# Patient Record
Sex: MALE | Race: BLACK OR AFRICAN AMERICAN | Employment: STUDENT | URBAN - METROPOLITAN AREA
[De-identification: names, ages, dates, MRNs, and addresses within clinical notes are randomized per-mention and may not be internally consistent; named-entity substitution may affect disease eponyms.]

---
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
## 2025-05-20 NOTE — PATIENT INSTRUCTIONS
Thank you for choosing Jesus Daley MD at [facility]  To Do: Amando Beatty  1. Please see below   Call [phone] to schedule the appointment.   Please signup for [employer], which is electronic access to your record if you have not done so.  All your results will post on there.  [email or web address]   You can NOW use [employer] to book your appointments with us, or consider using open access scheduling which is through the [facility] website [email or web address] and type in Jesus Daley MD and follow the links for \"Schedule Online Now\"    To schedule Imaging or tests at [place] call Central Scheduling [phone], Go to [facility] A ER Building (For example: CT scans, X rays, Ultrasound, MRI)  Cardiac Testing in ER building Building A second floor Cardiac Testing [phone] (For example: Holter Monitor, Cardiac Stress tests,Event Monitor, or 2D Echocardiograms)  [facility] call [phone] usually in [facility] A  Walk in Clinic in [place] at [address] Mon-Fri at 8am-7:30 p.m., and Sat/Sun 9:00a.m.-4:30 p.m.  Also at [address] [place] [address]  Call [phone] for info     Please call our office about any questions regarding your treatment/medicines/tests as a result of today's visit.  For your safety, read the entire package insert of all medicines prescribed to you and be aware of all of the risks of treatment even beyond those discussed today.  All therapies have potential risk of harm or side effects or medication interactions.  It is your duty and for your safety to discuss with the pharmacist and our office with questions, and to notify us and stop treatment if problems arise, but know that our intention is that the benefits outweigh those potential risks and we strive to make you healthier and to improve your quality of life.    Referrals, and Radiology Information:    If your insurance requires a referral to a specialist, please allow 5 business days to process your  referral request.    If Jesus Daley MD orders a CT or MRI, it may take up to 10 business days to receive approval from your insurance company. Once our office has called informing you that the insurance company approved your testing, please call Central Scheduling at [phone]  Please allow our office 5 business days to contact you regarding any testing results.    Refill policies:   Allow 3 business days for refills; controlled substances may take longer and must be picked up from the office in person.  Narcotic medications can only be filled in 30 day increments and must be refilled at an office visit only.  If your prescription is due for a refill, you may be due for a follow-up appointment.  We cannot refill your maintenance medications at a preventative wellness visit.  To best provide you care, patients receiving maintenance medications need to be seen at least twice a year.

## 2025-05-20 NOTE — PROGRESS NOTES
Subjective:   Patient ID: Amando Beatty is a 19 year old male.    KAREN Goel is a pleasant 18 y/o M who is generally healthy here today after he had right shoulder arthroscopy for labrum tear and [place] where he goes to college and play football for.  No complications from the surgery.  He is currently on summer break and was recommended by his orthopedic surgeon to have physical therapy.  He does not have any pain and limited range of motion.    I had reviewed past medical and family histories together with allergy and medication lists documented.    History/Other:   Review of Systems   Musculoskeletal:  Negative for arthralgias, joint swelling and myalgias.   Neurological:  Negative for weakness and numbness.     Current Medications[1]  Allergies:Allergies[2]    Objective:   Physical Exam  Vitals reviewed.   Constitutional:       General: He is not in acute distress.  HENT:      Mouth/Throat:      Mouth: Mucous membranes are moist.      Pharynx: Oropharynx is clear.   Eyes:      General: No scleral icterus.     Conjunctiva/sclera: Conjunctivae normal.   Cardiovascular:      Rate and Rhythm: Normal rate and regular rhythm.      Heart sounds: Normal heart sounds. No murmur heard.  Pulmonary:      Effort: Pulmonary effort is normal. No respiratory distress.      Breath sounds: Normal breath sounds. No wheezing or rales.   Abdominal:      General: Bowel sounds are normal.      Palpations: Abdomen is soft.   Musculoskeletal:      Right shoulder: No swelling, effusion, tenderness, bony tenderness or crepitus. Normal range of motion. Normal strength.      Cervical back: Neck supple.      Right lower leg: No edema.      Left lower leg: No edema.   Lymphadenopathy:      Cervical: No cervical adenopathy.   Skin:     General: Skin is warm.   Neurological:      Mental Status: He is alert.   Psychiatric:         Mood and Affect: Mood normal.         Behavior: Behavior normal.         Assessment & Plan:   1. Acetabular labrum  tear, right, subsequent encounter    Status post right arthroscopic surgery  Will refer for physical therapy for strengthening exercises  Otherwise I do not have any concerns with his overall health      This note was prepared using Dragon Medical voice recognition dictation software. As a result errors may occur. When identified these errors have been corrected. While every attempt is made to correct errors during dictation discrepancies may still exist            No orders of the defined types were placed in this encounter.      Meds This Visit:  Requested Prescriptions      No prescriptions requested or ordered in this encounter       Imaging & Referrals:  OP REFERRAL TO [facility]         [1]   No current outpatient medications on file.   [2] No Known Allergies

## 2025-05-21 NOTE — PROGRESS NOTES
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 Was able to participate his freshman and sophomore years but gradually became weaker, prompting surgery. At his last MD follow up, he estimated 4-6 months return to sport.   Pain level: current 0 /10, at best  , at worst    Description of symptoms: weak   Occupation: student - sports management   Leisure activities/Hobbies: collegiate D2 athlete at [employer]   Prior level of function: high level  Current limitations: lifting, prolonged reaching overhead, contact sports  Pt goals: return to football this fall, hoping not to red shirt  Hand Dominance: right   Past medical history was reviewed with Amando.  Significant findings include: none  Imaging/Tests:     Amando  has no past medical history on file.  He  has a past surgical history that includes adenoidectomy.    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 The results of the objective tests and measures show reduced shoulder ROM, weakness, and functional mobility/return to sport deficits. Functional deficits include but are not limited to lifting, prolonged reaching overhead, contact sports. Signs and symptoms are consistent with diagnosis of Tear of right glenoid labrum, subsequent encounter (Y82.700T). Pt and PT discussed evaluation findings, pathology, POC and HEP.  Pt voiced  understanding and performs HEP correctly without reported pain. Skilled Physical Therapy is medically necessary to address the above impairments and reach functional goals.  OBJECTIVE:    Musculoskeletal  Observation/Posture: forward head posture; increased thoracic kyphosis (dynamically can correct)  Palpation:     Accessory motion: not assessed due to surgery    Special Tests:       ROM and Strength (* denotes performed with pain)  Shoulder   ROM MMT (-/5)    R L R L     Flex 142   4/5 5/5     Ext             Abd (C5) 148   4-/5 5/5     IR T12 T8 4/5 5/5     ER T4 T4 4-/5 5/5     Low Trap n/a         Mid Trap n/a         SA n/a           Flexibility:  UE Flexibility R L     Upper Trap         Levator Scap         Pec Major         Scalenes         Latissimus         Bicep         LE Flexibility Other R L              Neurological:  Sensation: normal      Today's Treatment and Response:   Pt education was provided on exam findings, treatment diagnosis, treatment plan, expectations, and prognosis.  Today's Treatment       5/21/2025   UE Treatment   Therapeutic Exercise R shoulder PROM with gentle OP  Wall slides flex/scap, 15x5\" ea  Sidelying ER, 2#, 3x10  Sidelying habd, 2#, 3x10  Resisted standing row, green TB, 3x10  Resisted standing ext, green TB, 3x10  Resisted HABD standing, green TB, 3x10   Therapeutic Exercise Minutes 24   Evaluation Minutes 20   Total Time Of Timed Procedures 24   Total Time Of Service-Based Procedures 20   Total Treatment Time 44   HEP Access Code: [email or web address]  URL: [email or web address]  Date: 05/21/2025  Prepared by: Shelby Larsen    Exercises  - Standing Single Shoulder Flexion Wall Slide with Palm Up  - 2 x daily - 7 x weekly - 3 sets - 10 reps  - Standing Shoulder Abduction Slides at Wall  - 2 x daily - 7 x weekly - 3 sets - 10 reps  - Sidelying Shoulder External Rotation  - 1 x daily - 7 x weekly - 3 sets - 10 reps - 2-5 #  - Sidelying Shoulder Horizontal  Abduction  - 1 x daily - 7 x weekly - 3 sets - 10 reps - 2-5 #  - Standing Shoulder Row with Anchored Resistance  - 1 x daily - 7 x weekly - 3 sets - 10 reps - green band  - Shoulder extension with resistance - Neutral  - 1 x daily - 7 x weekly - 2 sets - 15 reps - green band  - Standing Shoulder Horizontal Abduction with Anchored Resistance  - 1 x daily - 7 x weekly - 3 sets - 10 reps - green band        Patient was instructed in and issued a HEP for: Access Code: [email or web address]  URL: [email or web address]  Date: 05/21/2025  Prepared by: Shelby Larsen    Exercises  - Standing Single Shoulder Flexion Wall Slide with Palm Up  - 2 x daily - 7 x weekly - 3 sets - 10 reps  - Standing Shoulder Abduction Slides at Wall  - 2 x daily - 7 x weekly - 3 sets - 10 reps  - Sidelying Shoulder External Rotation  - 1 x daily - 7 x weekly - 3 sets - 10 reps - 2-5 #  - Sidelying Shoulder Horizontal Abduction  - 1 x daily - 7 x weekly - 3 sets - 10 reps - 2-5 #  - Standing Shoulder Row with Anchored Resistance  - 1 x daily - 7 x weekly - 3 sets - 10 reps - green band  - Shoulder extension with resistance - Neutral  - 1 x daily - 7 x weekly - 2 sets - 15 reps - green band  - Standing Shoulder Horizontal Abduction with Anchored Resistance  - 1 x daily - 7 x weekly - 3 sets - 10 reps - green band    Charges:  PT EVAL: Low Complexity, 2 TE  In agreement with evaluation findings and clinical rationale, this evaluation involved LOW COMPLEXITY decision making due to no personal factors/comorbidities, 1-2 body structures involved/activity limitations, and stable symptoms as documented in the evaluation.                                                          PLAN OF CARE:    Goals: (to be met in 24 visits)       Goals: (To be met in 8-10 weeks post op )      Not Met Progress Toward Partially Met Met   Pt will improve shoulder flexion AROM to >150 degrees to be able to reach into overhead cabinets without pain or restriction  []  []  []  []    Pt will improve shoulder abduction AROM to >130 degrees to improve ability to don deodorant, don/doff shirts, and wash hair  []  []  []  []    Pt will increase shoulder AROM ER to 80 degrees to be able to reach and fasten seatbelt  []  []  []  []    Pt will increase shoulder AROM IR to 70 degrees to be able to reach in back pocket, tuck in shirt, and turn steering wheel without pain []  []  []  []    Pt will be independent and compliant with comprehensive HEP to maintain progress achieved in PT  []  []  []  []         Goals: (To be met 4-6 months post op       Not Met Progress Toward Partially Met Met   Pt will improve shoulder strength throughout to 5/5 to improve function with return to sport []  []  []  []    Pt will demonstrate increased mid/low trap strength to 5/5 to promote improved shoulder mechanics and stabilization with lifting and reaching []  []  []  []    Pt will be independent and compliant with comprehensive HEP to maintain progress achieved in PT  []  []  []  []           Frequency / Duration: Patient will be seen 2x/week or a total of 24  visits over a 90 day period. Treatment will include: Manual Therapy; Neuromuscular Re-education; Therapeutic Activities; Therapeutic Exercise; Home Exercise Program instruction; Electrical stimulation (unattended); Patient/Family Education    Education or treatment limitation: None   Rehab Potential: excellent     QuickDASH Outcome Score  Score: (Patient-Rptd) 18.18 % (5/21/2025  2:11 PM)      Patient/Family/Caregiver was advised of these findings, precautions, and treatment options and has agreed to actively participate in planning and for this course of care.    Thank you for your referral. Please co-sign or sign and return this letter via fax as soon as possible to [phone]. If you have any questions, please contact me at Dept: [phone]    Sincerely,  Electronically signed by therapist: Shelby John, PT  Physician's certification  required: Yes  I certify the need for these services furnished under this plan of treatment and while under my care.    X___________________________________________________ Date____________________    Certification From: 5/21/2025  To:8/19/2025

## 2025-05-23 NOTE — PROGRESS NOTES
Patient: Amando Beatty (19 year old, male) Referring Provider:  Insurance:   Diagnosis: Tear of right glenoid labrum, subsequent encounter (S48.659S) Jesus Daley  [place] [employer]   Date of Surgery: 4/1/25 Next MD visit:  N/A   Precautions:    6/18/25 Referral Information:    Date of Evaluation: Req: 12, Auth: 12, Exp: 7/20/2025 05/21/25 POC Auth Visits:          Today's Date   5/23/2025    Subjective  States no soreness or complaints after first visit.       Pain: 0/10     Objective  mild shoulder shrug with light resisted scaption - visual cues assisted to reduce       Shoulder       5/21/2025 5/23/2025   Shoulder ROM/MMT   Rt Flexion Shoulder 142 170 P with stiffness at end range   Rt Flexion Shoulder MMT 4/5    Lt Flexion Shoulder MMT 5/5    Rt Abduction Shoulder 148 165 P   Rt Abduction Shoulder MMT (C5) 4-/5    Lt Abduction Shoulder MMT (C5) 5/5    Rt IR Shoulder T12 60 P   Lt IR Shoulder T8    Rt IR Shoulder MMT 4/5    Lt IR Shoulder MMT 5/5    Rt ER Shoulder T4 95 P   Lt ER Shoulder T4    Rt ER Shoulder MMT 4-/5    Lt ER Shoulder MMT 5/5         Assessment  Pt displays excellent compliance with HEP. He displays less stiffness with PROM this visits as compared to IE. He was progressed in gentle resisted exercises emphasizing correct form and muscle activation.    Goals (to be met in 24 visits)   Goals: (To be met in 8-10 weeks post op )      Not Met Progress Toward Partially Met Met   Pt will improve shoulder flexion AROM to >150 degrees to be able to reach into overhead cabinets without pain or restriction []  []  []  []    Pt will improve shoulder abduction AROM to >130 degrees to improve ability to don deodorant, don/doff shirts, and wash hair  []  []  []  []    Pt will increase shoulder AROM ER to 80 degrees to be able to reach and fasten seatbelt  []  []  []  []    Pt will increase shoulder AROM IR to 70 degrees to be able to reach in back pocket, tuck in shirt, and turn steering wheel without  pain []  []  []  []    Pt will be independent and compliant with comprehensive HEP to maintain progress achieved in PT  []  []  []  []         Goals: (To be met 4-6 months post op       Not Met Progress Toward Partially Met Met   Pt will improve shoulder strength throughout to 5/5 to improve function with return to sport []  []  []  []    Pt will demonstrate increased mid/low trap strength to 5/5 to promote improved shoulder mechanics and stabilization with lifting and reaching []  []  []  []    Pt will be independent and compliant with comprehensive HEP to maintain progress achieved in PT  []  []  []  []               Plan  Continue per protocol and pt tolerance.    Treatment Last 4 Visits  Treatment Day: 2       5/21/2025 5/23/2025   UE Treatment   Therapeutic Exercise R shoulder PROM with gentle OP  Wall slides flex/scap, 15x5\" ea  Sidelying ER, 2#, 3x10  Sidelying habd, 2#, 3x10  Resisted standing row, green TB, 3x10  Resisted standing ext, green TB, 3x10  Resisted HABD standing, green TB, 3x10 UBE 2'/2'  Wall slides flex/scap, 10x10\" ea   Gentle BTB with pulleys 2'  R shoulder PROM with gentle OP in all directions  Supine RS at 60, 90, 120 deg flex, 1x30\" ea   Serratus press in supine, 3#, 3x10  Sidelying ER, 3#, 3x10  Sidelying HABD, 3#, 3x10  Prone row, 5#, 3x10  Prone ext, 3#, 3x10  Prone T, 2#, 3x10  Prone Y, 0#, 3x10  Standing IYT, 2#, 2x10  Resisted bilat ER, GTB, 3x10  Resisted bilat HABD, GTB, 3x10  Weighted ball CKC scap stabilization sup/inf, RL, circles at wall, red ball, x30 ea in flex and scap  Pec stretch door, 1x30\"  Posterior cuff stretch, 1x30\"      Therapeutic Exercise Minutes 24 45   Evaluation Minutes 20    Total Time Of Timed Procedures 24 45   Total Time Of Service-Based Procedures 20 0   Total Treatment Time 44 45   HEP Access Code: [email or web address]  URL: [email or web address]  Date: 05/21/2025  Prepared by: Shelby Larsen    Exercises  - Standing Single Shoulder Flexion Wall  Slide with Palm Up  - 2 x daily - 7 x weekly - 3 sets - 10 reps  - Standing Shoulder Abduction Slides at Wall  - 2 x daily - 7 x weekly - 3 sets - 10 reps  - Sidelying Shoulder External Rotation  - 1 x daily - 7 x weekly - 3 sets - 10 reps - 2-5 #  - Sidelying Shoulder Horizontal Abduction  - 1 x daily - 7 x weekly - 3 sets - 10 reps - 2-5 #  - Standing Shoulder Row with Anchored Resistance  - 1 x daily - 7 x weekly - 3 sets - 10 reps - green band  - Shoulder extension with resistance - Neutral  - 1 x daily - 7 x weekly - 2 sets - 15 reps - green band  - Standing Shoulder Horizontal Abduction with Anchored Resistance  - 1 x daily - 7 x weekly - 3 sets - 10 reps - green band         HEP  Access Code: [email or web address]  URL: [email or web address]  Date: 05/21/2025  Prepared by: Shelby Larsen    Exercises  - Standing Single Shoulder Flexion Wall Slide with Palm Up  - 2 x daily - 7 x weekly - 3 sets - 10 reps  - Standing Shoulder Abduction Slides at Wall  - 2 x daily - 7 x weekly - 3 sets - 10 reps  - Sidelying Shoulder External Rotation  - 1 x daily - 7 x weekly - 3 sets - 10 reps - 2-5 #  - Sidelying Shoulder Horizontal Abduction  - 1 x daily - 7 x weekly - 3 sets - 10 reps - 2-5 #  - Standing Shoulder Row with Anchored Resistance  - 1 x daily - 7 x weekly - 3 sets - 10 reps - green band  - Shoulder extension with resistance - Neutral  - 1 x daily - 7 x weekly - 2 sets - 15 reps - green band  - Standing Shoulder Horizontal Abduction with Anchored Resistance  - 1 x daily - 7 x weekly - 3 sets - 10 reps - green band    Charges  3 TE

## 2025-05-27 NOTE — PROGRESS NOTES
Patient: Amando Beatty (19 year old, male) Referring Provider:  Insurance:   Diagnosis: Tear of right glenoid labrum, subsequent encounter (M11.656F) Jesus Daley  [place] [employer] MEDICAID   Date of Surgery: 4/1/25 Next MD visit:  N/A   Precautions:    6/18/25 Referral Information:    Date of Evaluation: Req: 12, Auth: 12, Exp: 7/20/2025 05/21/25 POC Auth Visits:          Today's Date   5/27/2025    Subjective  Biceps soreness noted, otherwise no new complaints.       Pain: 0/10     Objective  Improved shoulder shrug in clinic.          Assessment  Pt continues to progress exceptionally well through ROM adn strength. Fatigue evident and muscle weakness still present as muscle fasciculations present with increased resistance in clinic, and more rest breaks needed.    Goals (to be met in 24 visits)   Goals: (To be met in 8-10 weeks post op )      Not Met Progress Toward Partially Met Met   Pt will improve shoulder flexion AROM to >150 degrees to be able to reach into overhead cabinets without pain or restriction []  []  []  []    Pt will improve shoulder abduction AROM to >130 degrees to improve ability to don deodorant, don/doff shirts, and wash hair  []  []  []  []    Pt will increase shoulder AROM ER to 80 degrees to be able to reach and fasten seatbelt  []  []  []  []    Pt will increase shoulder AROM IR to 70 degrees to be able to reach in back pocket, tuck in shirt, and turn steering wheel without pain []  []  []  []    Pt will be independent and compliant with comprehensive HEP to maintain progress achieved in PT  []  []  []  []         Goals: (To be met 4-6 months post op       Not Met Progress Toward Partially Met Met   Pt will improve shoulder strength throughout to 5/5 to improve function with return to sport []  []  []  []    Pt will demonstrate increased mid/low trap strength to 5/5 to promote improved shoulder mechanics and stabilization with lifting and reaching []  []  []  []    Pt will be independent  and compliant with comprehensive HEP to maintain progress achieved in PT  []  []  []  []                   Plan  Continue per protocol and pt tolerance.    Treatment Last 4 Visits  Treatment Day: 3       5/21/2025 5/23/2025 5/27/2025   UE Treatment   Therapeutic Exercise R shoulder PROM with gentle OP  Wall slides flex/scap, 15x5\" ea  Sidelying ER, 2#, 3x10  Sidelying habd, 2#, 3x10  Resisted standing row, green TB, 3x10  Resisted standing ext, green TB, 3x10  Resisted HABD standing, green TB, 3x10 UBE 2'/2'  Wall slides flex/scap, 10x10\" ea   Gentle BTB with pulleys 2'  R shoulder PROM with gentle OP in all directions  Supine RS at 60, 90, 120 deg flex, 1x30\" ea   Serratus press in supine, 3#, 3x10  Sidelying ER, 3#, 3x10  Sidelying HABD, 3#, 3x10  Prone row, 5#, 3x10  Prone ext, 3#, 3x10  Prone T, 2#, 3x10  Prone Y, 0#, 3x10  Standing IYT, 2#, 2x10  Resisted bilat ER, GTB, 3x10  Resisted bilat HABD, GTB, 3x10  Weighted ball CKC scap stabilization sup/inf, RL, circles at wall, red ball, x30 ea in flex and scap  Pec stretch door, 1x30\"  Posterior cuff stretch, 1x30\"    UBE 2'/2'   Wall slides flex/scap, 10x10\" ea   Pec stretch door, 2x30\"   R shoulder PROM with gentle OP in all directions   Serratus press in supine, 4#, 3x10   Sidelying ER, 4#, 3x10   Sidelying HABD, 4#, 3x10   Prone row, 7#, 3x10   Prone ext, 5#, 3x10   Prone T, 3#, 3x10   Prone Y, 2#, 3x10   Resisted anchored B HABD, GTB, 2x10   Resisted anchored B diagonals, GTB, 2x10 ea   Standing IYT, 2#, 2x10   Resisted bilat ER, GTB, 3x10   Biceps stretch, 2x30\" in door  Posterior cuff stretch, 2x30\"      Neuro Re-Education   Weighted ball CKC scap stabilization circles at wall, red ball, x30 ea in flex and scap   OKC weighted ball scap stabilization sup/inf and M/L, red ball, x30 ea in   Body blade static, AP, ML and sup/inf 1x30\"    Manual Therapy   IASTM: biceps, delt   Therapeutic Exercise Minutes 24 45 25   Neuro Re-Educ Minutes   12   Manual Therapy  Minutes   12   Evaluation Minutes 20     Total Time Of Timed Procedures 24 45 49   Total Time Of Service-Based Procedures 20 0 0   Total Treatment Time 44 45 49   HEP Access Code: [email or web address]  URL: [email or web address]  Date: 05/21/2025  Prepared by: Shelby TANNERChristopher    Exercises  - Standing Single Shoulder Flexion Wall Slide with Palm Up  - 2 x daily - 7 x weekly - 3 sets - 10 reps  - Standing Shoulder Abduction Slides at Wall  - 2 x daily - 7 x weekly - 3 sets - 10 reps  - Sidelying Shoulder External Rotation  - 1 x daily - 7 x weekly - 3 sets - 10 reps - 2-5 #  - Sidelying Shoulder Horizontal Abduction  - 1 x daily - 7 x weekly - 3 sets - 10 reps - 2-5 #  - Standing Shoulder Row with Anchored Resistance  - 1 x daily - 7 x weekly - 3 sets - 10 reps - green band  - Shoulder extension with resistance - Neutral  - 1 x daily - 7 x weekly - 2 sets - 15 reps - green band  - Standing Shoulder Horizontal Abduction with Anchored Resistance  - 1 x daily - 7 x weekly - 3 sets - 10 reps - green band          HEP  Access Code: [email or web address]  URL: [email or web address]  Date: 05/21/2025  Prepared by: Shelby O\'Hcristopher    Exercises  - Standing Single Shoulder Flexion Wall Slide with Palm Up  - 2 x daily - 7 x weekly - 3 sets - 10 reps  - Standing Shoulder Abduction Slides at Wall  - 2 x daily - 7 x weekly - 3 sets - 10 reps  - Sidelying Shoulder External Rotation  - 1 x daily - 7 x weekly - 3 sets - 10 reps - 2-5 #  - Sidelying Shoulder Horizontal Abduction  - 1 x daily - 7 x weekly - 3 sets - 10 reps - 2-5 #  - Standing Shoulder Row with Anchored Resistance  - 1 x daily - 7 x weekly - 3 sets - 10 reps - green band  - Shoulder extension with resistance - Neutral  - 1 x daily - 7 x weekly - 2 sets - 15 reps - green band  - Standing Shoulder Horizontal Abduction with Anchored Resistance  - 1 x daily - 7 x weekly - 3 sets - 10 reps - green band    Charges  2 TE, 1 NMR, 1 manual

## 2025-05-30 NOTE — PROGRESS NOTES
Patient: Amando Beatty (19 year old, male) Referring Provider:  Insurance:   Diagnosis: Tear of right glenoid labrum, subsequent encounter (O22.913W) Jesus Daley  [place] [employer] MEDICA[employer]   Date of Surgery: 4/1/25 Next MD visit:  N/A   Precautions:    6/18/25 Referral Information:    Date of Evaluation: Req: 12, Auth: 12, Exp: 7/20/2025 05/21/25 POC Auth Visits:          Today's Date   5/30/2025    Subjective  Biceps continues to be sore.       Pain: 2/10 (mid biceps)     Objective        Incisions healing well     Assessment  Educated on compensatory patterns that might be contributing to increased biceps irritation. Was progressed lightly in resistance this session to continue promoting strength and endurance.    Goals (to be met in 24 visits)   Goals: (To be met in 8-10 weeks post op )      Not Met Progress Toward Partially Met Met   Pt will improve shoulder flexion AROM to >150 degrees to be able to reach into overhead cabinets without pain or restriction []  []  []  []    Pt will improve shoulder abduction AROM to >130 degrees to improve ability to don deodorant, don/doff shirts, and wash hair  []  []  []  []    Pt will increase shoulder AROM ER to 80 degrees to be able to reach and fasten seatbelt  []  []  []  []    Pt will increase shoulder AROM IR to 70 degrees to be able to reach in back pocket, tuck in shirt, and turn steering wheel without pain []  []  []  []    Pt will be independent and compliant with comprehensive HEP to maintain progress achieved in PT  []  []  []  []         Goals: (To be met 4-6 months post op       Not Met Progress Toward Partially Met Met   Pt will improve shoulder strength throughout to 5/5 to improve function with return to sport []  []  []  []    Pt will demonstrate increased mid/low trap strength to 5/5 to promote improved shoulder mechanics and stabilization with lifting and reaching []  []  []  []    Pt will be independent and compliant with comprehensive HEP to maintain  progress achieved in PT  []  []  []  []                       Plan  Continue progression of strength, stabilization of RC and scapula    Treatment Last 4 Visits  Treatment Day: 4       5/21/2025 5/23/2025 5/27/2025 5/30/2025   UE Treatment   Therapeutic Exercise R shoulder PROM with gentle OP  Wall slides flex/scap, 15x5\" ea  Sidelying ER, 2#, 3x10  Sidelying habd, 2#, 3x10  Resisted standing row, green TB, 3x10  Resisted standing ext, green TB, 3x10  Resisted HABD standing, green TB, 3x10 UBE 2'/2'  Wall slides flex/scap, 10x10\" ea   Gentle BTB with pulleys 2'  R shoulder PROM with gentle OP in all directions  Supine RS at 60, 90, 120 deg flex, 1x30\" ea   Serratus press in supine, 3#, 3x10  Sidelying ER, 3#, 3x10  Sidelying HABD, 3#, 3x10  Prone row, 5#, 3x10  Prone ext, 3#, 3x10  Prone T, 2#, 3x10  Prone Y, 0#, 3x10  Standing IYT, 2#, 2x10  Resisted bilat ER, GTB, 3x10  Resisted bilat HABD, GTB, 3x10  Weighted ball CKC scap stabilization sup/inf, RL, circles at wall, red ball, x30 ea in flex and scap  Pec stretch door, 1x30\"  Posterior cuff stretch, 1x30\"    UBE 2'/2'   Wall slides flex/scap, 10x10\" ea   Pec stretch door, 2x30\"   R shoulder PROM with gentle OP in all directions   Serratus press in supine, 4#, 3x10   Sidelying ER, 4#, 3x10   Sidelying HABD, 4#, 3x10   Prone row, 7#, 3x10   Prone ext, 5#, 3x10   Prone T, 3#, 3x10   Prone Y, 2#, 3x10   Resisted anchored B HABD, GTB, 2x10   Resisted anchored B diagonals, GTB, 2x10 ea   Standing IYT, 2#, 2x10   Resisted bilat ER, GTB, 3x10   Biceps stretch, 2x30\" in door  Posterior cuff stretch, 2x30\"    UBE 3'/3'   Wall slides flex/scap, 10x10\" ea   Pec stretch door, 2x30\"   R shoulder PROM with gentle OP in all directions   Sidelying ER, 4#, 3x10   Sidelying HABD, 4#, 3x10  Prone row, 7#, 3x10   Prone ext, 5#, 3x10   Prone T, 4#, 3x10   Prone Y, 3#, 3x10   Resisted anchored B HABD, GTB, 2x10   Resisted anchored B diagonals, GTB, 2x10 ea   Tricep press down, black  tubing, 3x10   Bicep curl, blue tubing, 3x10  Standing IYT, 2#, 3x10   Push ups at barre, 2x10   Resisted bilat ER, GTB, 3x10   Biceps stretch, 2x30\" in door   Posterior cuff stretch, 2x30\"      Neuro Re-Education   Weighted ball CKC scap stabilization circles at wall, red ball, x30 ea in flex and scap   OKC weighted ball scap stabilization sup/inf and M/L, red ball, x30 ea in   Body blade static, AP, ML and sup/inf 1x30\"  RS supine 60, 90, 120 deg flexion, 1x30\"   Supine alphabets, 2#, 2 rounds  Weighted ball CKC scap stabilization circles at wall, red ball, x30 ea in flex and scap   OKC weighted ball scap stabilization sup/inf and M/L, red ball, x30 ea in  Foam roll serratus wall slides, 3x10  Body blade static, AP, ML, sup/inf low and high, lateral AP 1x30\"      Manual Therapy   IASTM: biceps, delt STM/IASTM: biceps, delt   Therapeutic Exercise Minutes 24 45 25 25   Neuro Re-Educ Minutes   12 25   Manual Therapy Minutes   12 10   Evaluation Minutes 20      Total Time Of Timed Procedures 24 45 49 60   Total Time Of Service-Based Procedures 20 0 0 0   Total Treatment Time 44 45 49 60   HEP Access Code: [email or web address]  URL: [email or web address]  Date: 05/21/2025  Prepared by: Shelby Larsen    Exercises  - Standing Single Shoulder Flexion Wall Slide with Palm Up  - 2 x daily - 7 x weekly - 3 sets - 10 reps  - Standing Shoulder Abduction Slides at Wall  - 2 x daily - 7 x weekly - 3 sets - 10 reps  - Sidelying Shoulder External Rotation  - 1 x daily - 7 x weekly - 3 sets - 10 reps - 2-5 #  - Sidelying Shoulder Horizontal Abduction  - 1 x daily - 7 x weekly - 3 sets - 10 reps - 2-5 #  - Standing Shoulder Row with Anchored Resistance  - 1 x daily - 7 x weekly - 3 sets - 10 reps - green band  - Shoulder extension with resistance - Neutral  - 1 x daily - 7 x weekly - 2 sets - 15 reps - green band  - Standing Shoulder Horizontal Abduction with Anchored Resistance  - 1 x daily - 7 x weekly - 3 sets - 10 reps  - green band           HEP  Access Code: [email or web address]  URL: [email or web address]  Date: 05/21/2025  Prepared by: Shebly Larsen    Exercises  - Standing Single Shoulder Flexion Wall Slide with Palm Up  - 2 x daily - 7 x weekly - 3 sets - 10 reps  - Standing Shoulder Abduction Slides at Wall  - 2 x daily - 7 x weekly - 3 sets - 10 reps  - Sidelying Shoulder External Rotation  - 1 x daily - 7 x weekly - 3 sets - 10 reps - 2-5 #  - Sidelying Shoulder Horizontal Abduction  - 1 x daily - 7 x weekly - 3 sets - 10 reps - 2-5 #  - Standing Shoulder Row with Anchored Resistance  - 1 x daily - 7 x weekly - 3 sets - 10 reps - green band  - Shoulder extension with resistance - Neutral  - 1 x daily - 7 x weekly - 2 sets - 15 reps - green band  - Standing Shoulder Horizontal Abduction with Anchored Resistance  - 1 x daily - 7 x weekly - 3 sets - 10 reps - green band    Charges  2 TE, 2 NMR, 1 manual

## 2025-06-04 NOTE — PROGRESS NOTES
Patient: Amando Beatty (19 year old, male) Referring Provider:  Insurance:   Diagnosis: Tear of right glenoid labrum, subsequent encounter (R89.532K) Jesus Daley  [place] [employer] MEDICA[employer]   Date of Surgery: 4/1/25 Next MD visit:  N/A   Precautions:    6/18/25 Referral Information:    Date of Evaluation: Req: 12, Auth: 12, Exp: 7/20/2025 05/21/25 POC Auth Visits:          Today's Date   6/4/2025    Subjective  Biceps felt better but fatigues quickly in therapy exercises       Pain: 1/10     Objective  minimal shoulder shrug present with exercises performed in clinic; mild end range limitations in PROM in flexion/abduction. WNL IR/ER          Assessment  Overall progressing exceptionally well. Continues to notice endurance limitations contributing to early fatigue which might be aiding in biceps sorness. Continued progression of scapular and RC strengthening to improve shoulder stability for eventual higher level activity and return to sport.    Goals (to be met in 24 visits)   Goals: (To be met in 8-10 weeks post op )      Not Met Progress Toward Partially Met Met   Pt will improve shoulder flexion AROM to >150 degrees to be able to reach into overhead cabinets without pain or restriction []  []  []  []    Pt will improve shoulder abduction AROM to >130 degrees to improve ability to don deodorant, don/doff shirts, and wash hair  []  []  []  []    Pt will increase shoulder AROM ER to 80 degrees to be able to reach and fasten seatbelt  []  []  []  []    Pt will increase shoulder AROM IR to 70 degrees to be able to reach in back pocket, tuck in shirt, and turn steering wheel without pain []  []  []  []    Pt will be independent and compliant with comprehensive HEP to maintain progress achieved in PT  []  []  []  []         Goals: (To be met 4-6 months post op       Not Met Progress Toward Partially Met Met   Pt will improve shoulder strength throughout to 5/5 to improve function with return to sport []  []  []  []    Pt  will demonstrate increased mid/low trap strength to 5/5 to promote improved shoulder mechanics and stabilization with lifting and reaching []  []  []  []    Pt will be independent and compliant with comprehensive HEP to maintain progress achieved in PT  []  []  []  []                           Plan  Continue progression of strength, stabilization of RC and scapula    Treatment Last 4 Visits  Treatment Day: 5       5/23/2025 5/27/2025 5/30/2025 6/4/2025   UE Treatment   Therapeutic Exercise UBE 2'/2'  Wall slides flex/scap, 10x10\" ea   Gentle BTB with pulleys 2'  R shoulder PROM with gentle OP in all directions  Supine RS at 60, 90, 120 deg flex, 1x30\" ea   Serratus press in supine, 3#, 3x10  Sidelying ER, 3#, 3x10  Sidelying HABD, 3#, 3x10  Prone row, 5#, 3x10  Prone ext, 3#, 3x10  Prone T, 2#, 3x10  Prone Y, 0#, 3x10  Standing IYT, 2#, 2x10  Resisted bilat ER, GTB, 3x10  Resisted bilat HABD, GTB, 3x10  Weighted ball CKC scap stabilization sup/inf, RL, circles at wall, red ball, x30 ea in flex and scap  Pec stretch door, 1x30\"  Posterior cuff stretch, 1x30\"    UBE 2'/2'   Wall slides flex/scap, 10x10\" ea   Pec stretch door, 2x30\"   R shoulder PROM with gentle OP in all directions   Serratus press in supine, 4#, 3x10   Sidelying ER, 4#, 3x10   Sidelying HABD, 4#, 3x10   Prone row, 7#, 3x10   Prone ext, 5#, 3x10   Prone T, 3#, 3x10   Prone Y, 2#, 3x10   Resisted anchored B HABD, GTB, 2x10   Resisted anchored B diagonals, GTB, 2x10 ea   Standing IYT, 2#, 2x10   Resisted bilat ER, GTB, 3x10   Biceps stretch, 2x30\" in door  Posterior cuff stretch, 2x30\"    UBE 3'/3'   Wall slides flex/scap, 10x10\" ea   Pec stretch door, 2x30\"   R shoulder PROM with gentle OP in all directions   Sidelying ER, 4#, 3x10   Sidelying HABD, 4#, 3x10  Prone row, 7#, 3x10   Prone ext, 5#, 3x10   Prone T, 4#, 3x10   Prone Y, 3#, 3x10   Resisted anchored B HABD, GTB, 2x10   Resisted anchored B diagonals, GTB, 2x10 ea   Tricep press down, black  tubing, 3x10   Bicep curl, blue tubing, 3x10  Standing IYT, 2#, 3x10   Push ups at barre, 2x10   Resisted bilat ER, GTB, 3x10   Biceps stretch, 2x30\" in door   Posterior cuff stretch, 2x30\"    UBE 3'/3'   Wall slides flex/scap, 10x10\" ea   Pec stretch door, 2x30\"   R shoulder PROM with gentle OP in all directions   Sidelying ER, 4#, 3x10   Sidelying HABD, 4#, 3x10   Prone row, 9#, 3x10   Prone ext, 6#, 3x10   Prone T, 4#, 3x10   Prone Y, 3#, 3x10   Push ups at barre, 3x10   Resisted anchored B HABD, BluTB, 2x10   Resisted anchored B diagonals, BluTB, 2x10 ea   Tricep press down, black tubing, 3x10   Bicep curl, black tubing, 3x10     Standing IYT, 2#, 3x10   Resisted bilat ER, GTB, 3x10   Biceps stretch, 2x30\" in door   Posterior cuff stretch, 2x30\"      Neuro Re-Education  Weighted ball CKC scap stabilization circles at wall, red ball, x30 ea in flex and scap   OKC weighted ball scap stabilization sup/inf and M/L, red ball, x30 ea in   Body blade static, AP, ML and sup/inf 1x30\"  RS supine 60, 90, 120 deg flexion, 1x30\"   Supine alphabets, 2#, 2 rounds  Weighted ball CKC scap stabilization circles at wall, red ball, x30 ea in flex and scap   OKC weighted ball scap stabilization sup/inf and M/L, red ball, x30 ea in  Foam roll serratus wall slides, 3x10  Body blade static, AP, ML, sup/inf low and high, lateral AP 1x30\"    4 way body blade, 2x30\" ea    Manual Therapy  IASTM: biceps, delt STM/IASTM: biceps, delt    Therapeutic Exercise Minutes 45 25 25    Neuro Re-Educ Minutes  12 25    Manual Therapy Minutes  12 10    Total Time Of Timed Procedures 45 49 60 0   Total Time Of Service-Based Procedures 0 0 0 0   Total Treatment Time 45 49 60 0        HEP  Access Code: [email or web address]  URL: [email or web address]  Date: 05/21/2025  Prepared by: Shelby Larsen    Exercises  - Standing Single Shoulder Flexion Wall Slide with Palm Up  - 2 x daily - 7 x weekly - 3 sets - 10 reps  - Standing Shoulder Abduction  Slides at Wall  - 2 x daily - 7 x weekly - 3 sets - 10 reps  - Sidelying Shoulder External Rotation  - 1 x daily - 7 x weekly - 3 sets - 10 reps - 2-5 #  - Sidelying Shoulder Horizontal Abduction  - 1 x daily - 7 x weekly - 3 sets - 10 reps - 2-5 #  - Standing Shoulder Row with Anchored Resistance  - 1 x daily - 7 x weekly - 3 sets - 10 reps - green band  - Shoulder extension with resistance - Neutral  - 1 x daily - 7 x weekly - 2 sets - 15 reps - green band  - Standing Shoulder Horizontal Abduction with Anchored Resistance  - 1 x daily - 7 x weekly - 3 sets - 10 reps - green band    Charges

## 2025-06-10 NOTE — PROGRESS NOTES
Patient: Amando Beatty (19 year old, male) Referring Provider:  Insurance:   Diagnosis: Tear of right glenoid labrum, subsequent encounter (T53.413P) Jesus Daley  [facility] MEDICAID   Date of Surgery: 4/1/25 Next MD visit:  N/A   Precautions:    6/18/25 Referral Information:    Date of Evaluation: Req: 12, Auth: 12, Exp: 7/20/2025 05/21/25 POC Auth Visits:          Today's Date   6/10/2025    Subjective  Pt reports only mild discomfort in the biceps.  Overall, there shoulder has been doing well.       Pain: 1/10     Objective  See flowsheet for details.       Assessment  Pt cont to progress very well in Physical Therapy towards all functional goals.  Pt did require intermittent cuing for form/posture during completion of strengthening exercises.  Pt did demonstrate muscle fatigue with completion of OH strengthening.  Mild discomfort noted with passive stretching into flex and abd. Will cont to focus on shoulder strengthening and stability.    Goals (to be met in 24 visits)   Goals: (To be met in 8-10 weeks post op )       Not Met Progress Toward Partially Met Met   Pt will improve shoulder flexion AROM to >150 degrees to be able to reach into overhead cabinets without pain or restriction []  []  []  []    Pt will improve shoulder abduction AROM to >130 degrees to improve ability to don deodorant, don/doff shirts, and wash hair  []  []  []  []    Pt will increase shoulder AROM ER to 80 degrees to be able to reach and fasten seatbelt  []  []  []  []    Pt will increase shoulder AROM IR to 70 degrees to be able to reach in back pocket, tuck in shirt, and turn steering wheel without pain []  []  []  []    Pt will be independent and compliant with comprehensive HEP to maintain progress achieved in PT  []  []  []  []          Goals: (To be met 4-6 months post op        Not Met Progress Toward Partially Met Met   Pt will improve shoulder strength throughout to 5/5 to improve function with return to sport []  []  []   []    Pt will demonstrate increased mid/low trap strength to 5/5 to promote improved shoulder mechanics and stabilization with lifting and reaching []  []  []  []    Pt will be independent and compliant with comprehensive HEP to maintain progress achieved in PT  []  []  []  []        Plan  Will cont to progress towards all functional goals.    Treatment Last 4 Visits  Treatment Day: 6       5/27/2025 5/30/2025 6/4/2025 6/10/2025   UE Treatment   Therapeutic Exercise UBE 2'/2'   Wall slides flex/scap, 10x10\" ea   Pec stretch door, 2x30\"   R shoulder PROM with gentle OP in all directions   Serratus press in supine, 4#, 3x10   Sidelying ER, 4#, 3x10   Sidelying HABD, 4#, 3x10   Prone row, 7#, 3x10   Prone ext, 5#, 3x10   Prone T, 3#, 3x10   Prone Y, 2#, 3x10   Resisted anchored B HABD, GTB, 2x10   Resisted anchored B diagonals, GTB, 2x10 ea   Standing IYT, 2#, 2x10   Resisted bilat ER, GTB, 3x10   Biceps stretch, 2x30\" in door  Posterior cuff stretch, 2x30\"    UBE 3'/3'   Wall slides flex/scap, 10x10\" ea   Pec stretch door, 2x30\"   R shoulder PROM with gentle OP in all directions   Sidelying ER, 4#, 3x10   Sidelying HABD, 4#, 3x10  Prone row, 7#, 3x10   Prone ext, 5#, 3x10   Prone T, 4#, 3x10   Prone Y, 3#, 3x10   Resisted anchored B HABD, GTB, 2x10   Resisted anchored B diagonals, GTB, 2x10 ea   Tricep press down, black tubing, 3x10   Bicep curl, blue tubing, 3x10  Standing IYT, 2#, 3x10   Push ups at barre, 2x10   Resisted bilat ER, GTB, 3x10   Biceps stretch, 2x30\" in door   Posterior cuff stretch, 2x30\"    UBE 3'/3'   Wall slides flex/scap, 10x10\" ea   Pec stretch door, 2x30\"   R shoulder PROM with gentle OP in all directions   Sidelying ER, 4#, 3x10   Sidelying HABD, 4#, 3x10   Prone row, 9#, 3x10   Prone ext, 6#, 3x10   Prone T, 4#, 3x10   Prone Y, 3#, 3x10   Push ups at barre, 3x10   Resisted anchored B HABD, BluTB, 2x10   Resisted anchored B diagonals, BluTB, 2x10 ea   Tricep press down, black tubing, 3x10    Bicep curl, black tubing, 3x10   Standing IYT, 2#, 3x10   Resisted bilat ER, GTB, 3x10   Biceps stretch, 2x30\" in door   Posterior cuff stretch, 2x30\"    UBE 3' fwd/3' bwd (L 5.0)  TRX       Flex 10x10 secs       Horiz Abd 10x10 secs       Ext 10x10 secs  3 Way Shoulder Raises 2x10 ea 2#  OH Press 3x10 2#  Biceps Curls 3x10 5#  B Shoulder ER 3x10 GTB  Sport Cord Shoulder IR/ER 3x10 B  PROM R Shoulder 8 mins   Neuro Re-Education Weighted ball CKC scap stabilization circles at wall, red ball, x30 ea in flex and scap   OKC weighted ball scap stabilization sup/inf and M/L, red ball, x30 ea in   Body blade static, AP, ML and sup/inf 1x30\"  RS supine 60, 90, 120 deg flexion, 1x30\"   Supine alphabets, 2#, 2 rounds  Weighted ball CKC scap stabilization circles at wall, red ball, x30 ea in flex and scap   OKC weighted ball scap stabilization sup/inf and M/L, red ball, x30 ea in  Foam roll serratus wall slides, 3x10  Body blade static, AP, ML, sup/inf low and high, lateral AP 1x30\"    RS supine 60, 90, 120 deg flexion, 1x30\"   4 way body blade, 2x30\" ea  MB Scapular Stabilization        Flex/Ext x30       M/L x30       Circles (CW/CCW) x30 ea       ABCs x2  Sport Cord Rows 3x10 Blk  Sport Cord Ext 3x10 B  Sport Cord Horiz Abd 3x10 G   Manual Therapy IASTM: biceps, delt STM/IASTM: biceps, delt     Therapeutic Exercise Minutes 25 25 45 35   Neuro Re-Educ Minutes 12 25 5 12   Manual Therapy Minutes 12 10     Total Time Of Timed Procedures 49 60 50 47   Total Time Of Service-Based Procedures 0 0 0 0   Total Treatment Time 49 60 50 47        HEP  Access Code: [email or web address]  URL: [email or web address]  Date: 05/21/2025  Prepared by: Shelby Larsen    Exercises  - Standing Single Shoulder Flexion Wall Slide with Palm Up  - 2 x daily - 7 x weekly - 3 sets - 10 reps  - Standing Shoulder Abduction Slides at Wall  - 2 x daily - 7 x weekly - 3 sets - 10 reps  - Sidelying Shoulder External Rotation  - 1 x daily - 7 x  weekly - 3 sets - 10 reps - 2-5 #  - Sidelying Shoulder Horizontal Abduction  - 1 x daily - 7 x weekly - 3 sets - 10 reps - 2-5 #  - Standing Shoulder Row with Anchored Resistance  - 1 x daily - 7 x weekly - 3 sets - 10 reps - green band  - Shoulder extension with resistance - Neutral  - 1 x daily - 7 x weekly - 2 sets - 15 reps - green band  - Standing Shoulder Horizontal Abduction with Anchored Resistance  - 1 x daily - 7 x weekly - 3 sets - 10 reps - green band    Charges  Therapeutic Ex x2, Neuromuscular Re-ed x1

## 2025-06-12 NOTE — PROGRESS NOTES
Patient: Amando Beatty (19 year old, male) Referring Provider:  Insurance:   Diagnosis: Tear of right glenoid labrum, subsequent encounter (W81.766H) Jesus Daley  [facility] MEDICAID   Date of Surgery: 4/1/25 Next MD visit:  N/A   Precautions:    6/18/25 Referral Information:    Date of Evaluation: Req: 12, Auth: 12, Exp: 7/20/2025 05/21/25 POC Auth Visits:          Today's Date   6/12/2025    Subjective  \"Feeling pretty good.\"       Pain: 1/10     Objective  See flowsheet for details.       Assessment  Pt tolerated all resistance progression today with only min disocmfort in the area of the biceps.  Muscle fatigue was observed throughout session.  Incorporated IASTM to biceps and lat shoulder musculature, which did provided pain relief.  Will cont to progress POC as tolerated.    Goals (to be met in 24 visits)   Goals: (To be met in 8-10 weeks post op )       Not Met Progress Toward Partially Met Met   Pt will improve shoulder flexion AROM to >150 degrees to be able to reach into overhead cabinets without pain or restriction []  []  []  []    Pt will improve shoulder abduction AROM to >130 degrees to improve ability to don deodorant, don/doff shirts, and wash hair  []  []  []  []    Pt will increase shoulder AROM ER to 80 degrees to be able to reach and fasten seatbelt  []  []  []  []    Pt will increase shoulder AROM IR to 70 degrees to be able to reach in back pocket, tuck in shirt, and turn steering wheel without pain []  []  []  []    Pt will be independent and compliant with comprehensive HEP to maintain progress achieved in PT  []  []  []  []          Goals: (To be met 4-6 months post op        Not Met Progress Toward Partially Met Met   Pt will improve shoulder strength throughout to 5/5 to improve function with return to sport []  []  []  []    Pt will demonstrate increased mid/low trap strength to 5/5 to promote improved shoulder mechanics and stabilization with lifting and reaching []  []  []  []     Pt will be independent and compliant with comprehensive HEP to maintain progress achieved in PT  []  []  []  []        Plan  Will cont to progress towards all functional goals.    Treatment Last 4 Visits  Treatment Day: 7       5/30/2025 6/4/2025 6/10/2025 6/12/2025   UE Treatment   Therapeutic Exercise UBE 3'/3'   Wall slides flex/scap, 10x10\" ea   Pec stretch door, 2x30\"   R shoulder PROM with gentle OP in all directions   Sidelying ER, 4#, 3x10   Sidelying HABD, 4#, 3x10  Prone row, 7#, 3x10   Prone ext, 5#, 3x10   Prone T, 4#, 3x10   Prone Y, 3#, 3x10   Resisted anchored B HABD, GTB, 2x10   Resisted anchored B diagonals, GTB, 2x10 ea   Tricep press down, black tubing, 3x10   Bicep curl, blue tubing, 3x10  Standing IYT, 2#, 3x10   Push ups at barre, 2x10   Resisted bilat ER, GTB, 3x10   Biceps stretch, 2x30\" in door   Posterior cuff stretch, 2x30\"    UBE 3'/3'   Wall slides flex/scap, 10x10\" ea   Pec stretch door, 2x30\"   R shoulder PROM with gentle OP in all directions   Sidelying ER, 4#, 3x10   Sidelying HABD, 4#, 3x10   Prone row, 9#, 3x10   Prone ext, 6#, 3x10   Prone T, 4#, 3x10   Prone Y, 3#, 3x10   Push ups at barre, 3x10   Resisted anchored B HABD, BluTB, 2x10   Resisted anchored B diagonals, BluTB, 2x10 ea   Tricep press down, black tubing, 3x10   Bicep curl, black tubing, 3x10   Standing IYT, 2#, 3x10   Resisted bilat ER, GTB, 3x10   Biceps stretch, 2x30\" in door   Posterior cuff stretch, 2x30\"    UBE 3' fwd/3' bwd (L 5.0)  TRX       Flex 10x10 secs       Horiz Abd 10x10 secs       Ext 10x10 secs  3 Way Shoulder Raises 2x10 ea 2#  OH Press 3x10 2#  Biceps Curls 3x10 5#  B Shoulder ER 3x10 GTB  Sport Cord Shoulder IR/ER 3x10 B  PROM R Shoulder 8 mins UBE 3' fwd/3' bwd (L 5.0)  TRX       Flex 10x10 secs       Horiz Abd 10x10 secs       Ext 10x10 secs  3 Way Shoulder Raises 3x10 ea 2#  OH Press 3x10 3#  Biceps Curls 3x10 6#  B Shoulder ER 3x10 GTB  Sport Cord Shoulder IR/ER 3x10 B   Neuro Re-Education RS  supine 60, 90, 120 deg flexion, 1x30\"   Supine alphabets, 2#, 2 rounds  Weighted ball CKC scap stabilization circles at wall, red ball, x30 ea in flex and scap   OKC weighted ball scap stabilization sup/inf and M/L, red ball, x30 ea in  Foam roll serratus wall slides, 3x10  Body blade static, AP, ML, sup/inf low and high, lateral AP 1x30\"    RS supine 60, 90, 120 deg flexion, 1x30\"   4 way body blade, 2x30\" ea  MB Scapular Stabilization        Flex/Ext x30       M/L x30       Circles (CW/CCW) x30 ea       ABCs x2  Sport Cord Rows 3x10 Blk  Sport Cord Ext 3x10 B  Sport Cord Horiz Abd 3x10 G MB Scapular Stabilization        Flex/Ext x30       M/L x30       Circles (CW/CCW) x30 ea       ABCs x2  Sport Cord Rows 3x10 orange  Sport Cord Ext 3x10 Blk  Sport Cord Triceps Ext 3x10 Blk  Sport Cord Horiz Abd 3x10 B  Body Blade 4 Way 2x30 secs ea   Manual Therapy STM/IASTM: biceps, delt   IASTM to the R biceps and lat shoulder musculature 10 mins   Therapeutic Exercise Minutes 25 45 35 23   Neuro Re-Educ Minutes 25 5 12 20   Manual Therapy Minutes 10   10   Total Time Of Timed Procedures 60 50 47 53   Total Time Of Service-Based Procedures 0 0 0 0   Total Treatment Time 60 50 47 53        HEP  Access Code: [email or web address]  URL: [email or web address]  Date: 05/21/2025  Prepared by: Shelby Larsen    Exercises  - Standing Single Shoulder Flexion Wall Slide with Palm Up  - 2 x daily - 7 x weekly - 3 sets - 10 reps  - Standing Shoulder Abduction Slides at Wall  - 2 x daily - 7 x weekly - 3 sets - 10 reps  - Sidelying Shoulder External Rotation  - 1 x daily - 7 x weekly - 3 sets - 10 reps - 2-5 #  - Sidelying Shoulder Horizontal Abduction  - 1 x daily - 7 x weekly - 3 sets - 10 reps - 2-5 #  - Standing Shoulder Row with Anchored Resistance  - 1 x daily - 7 x weekly - 3 sets - 10 reps - green band  - Shoulder extension with resistance - Neutral  - 1 x daily - 7 x weekly - 2 sets - 15 reps - green band  - Standing  Shoulder Horizontal Abduction with Anchored Resistance  - 1 x daily - 7 x weekly - 3 sets - 10 reps - green band    Charges  Therapeutic Ex x2, Neuromuscular Re-ed x1, Manual Therapy x1

## 2025-06-16 NOTE — PROGRESS NOTES
Patient: Amando Beatty (19 year old, male) Referring Provider:  Insurance:   Diagnosis: Tear of right glenoid labrum, subsequent encounter (S43.532D) Jesus Daley  [facility] MEDICAID   Date of Surgery: 4/1/25 Next MD visit:  N/A   Precautions:    6/18/25 Referral Information:    Date of Evaluation: Req: 12, Auth: 12, Exp: 7/20/2025 05/21/25 POC Auth Visits:          Today's Date   6/16/2025     Progress Summary  Pt has attended 8 visits in Physical Therapy.      Subjective  Pt states that his shoulder is doing well.  Pt reports occasional discomfort in the area of the biceps with certain activities.  Pt is eager to get back into the gym.       Pain: 0/10     Objective           5/21/2025 5/23/2025 6/16/2025   Shoulder ROM/MMT   Rt Flexion Shoulder 142 170 P with stiffness at end range 160 deg (A)   Rt Flexion Shoulder MMT 4/5  5/5   Lt Flexion Shoulder MMT 5/5  5/5   Rt Extension Shoulder MMT   5/5   Rt Extension Shoulder MMT   5/5   Rt Abduction Shoulder 148 165 P 167 deg (A)   Rt Abduction Shoulder MMT (C5) 4-/5  5/5   Lt Abduction Shoulder MMT (C5) 5/5  5/5   Rt IR Shoulder T12 60 P T6 (A)   Lt IR Shoulder T8     Rt IR Shoulder MMT 4/5  5/5   Lt IR Shoulder MMT 5/5     Rt ER Shoulder T4 95 P 83 deg (A)   Lt ER Shoulder T4     Rt ER Shoulder MMT 4-/5  5/5   Lt ER Shoulder MMT 5/5     Rt Low Trap MMT   4+/5   Lt Low Trap MMT   5/5   Rt Mid Trap MMT   4+/5   Lt Mid Trap MMT   5/5   Rt Rhomboids MMT   4+/5   Lt Rhomboids MMT   5/5      Assessment   Pt presents to Physical Therapy s/p R shoulder labral repair on 4/1/25.  Pt is progressing very well in Physical Therapy towards all functional goals.  Pt has demonstrated significant gains in AROM of the R shoulder, nearing full mobility.  Pt has also demonstrated improvement is UE/scapular strength since time of IE.  Pt cont to be functionally limited with lifting weights and participating in football.  Pt would cont to benefit from skilled Physical Therapy to  address functional and objective deficits and enable return to PLOF.  Thank you for referring Amando to [facility] [place].     Goals (to be met in 24 visits)   Goals: (To be met in 8-10 weeks post op )       Not Met Progress Toward Partially Met Met   Pt will improve shoulder flexion AROM to >150 degrees to be able to reach into overhead cabinets without pain or restriction []  []  []  [x]    Pt will improve shoulder abduction AROM to >130 degrees to improve ability to don deodorant, don/doff shirts, and wash hair  []  []  []  [x]    Pt will increase shoulder AROM ER to 80 degrees to be able to reach and fasten seatbelt  []  []  []  [x]    Pt will increase shoulder AROM IR to 70 degrees to be able to reach in back pocket, tuck in shirt, and turn steering wheel without pain []  []  []  [x]    Pt will be independent and compliant with comprehensive HEP to maintain progress achieved in PT  []  []  []  [x]          Goals: (To be met 4-6 months post op        Not Met Progress Toward Partially Met Met   Pt will improve shoulder strength throughout to 5/5 to improve function with return to sport []  [x]  []  []    Pt will demonstrate increased mid/low trap strength to 5/5 to promote improved shoulder mechanics and stabilization with lifting and reaching []  [x]  []  []    Pt will be independent and compliant with comprehensive HEP to maintain progress achieved in PT  []  [x]  []  []        QuickDASH Outcome Score  Score: (Patient-Rptd) 18.18 % (5/21/2025  2:11 PM)    Post QuickDASH Outcome Score  Post Score: (Patient-Rptd) 2.27 % (6/16/2025  3:31 PM)    15.91 % improvement    Plan: Continue skilled Physical Therapy 2 x/week or a total of 16 visits over a 90 day period. Treatment will include: therapeutic exercises, manual therapy, neuromuscular re-ed, and HEP education.       Patient was advised of these findings, precautions, and treatment options and has agreed to actively participate in  planning and for this course of care.    Thank you for your referral. If you have any questions, please contact me at Dept: [phone].    Sincerely,  Electronically signed by therapist: Cinthya Fajardo PT     Physician's certification required:  Yes  Please co-sign or sign and return this letter via fax as soon as possible to [phone].   I certify the need for these services furnished under this plan of treatment and while under my care.    X___________________________________________________ Date____________________    Certification From: 6/16/2025  To:9/14/2025         Treatment Last 4 Visits  Treatment Day: 8       6/4/2025 6/10/2025 6/12/2025 6/16/2025   UE Treatment   Therapeutic Exercise UBE 3'/3'   Wall slides flex/scap, 10x10\" ea   Pec stretch door, 2x30\"   R shoulder PROM with gentle OP in all directions   Sidelying ER, 4#, 3x10   Sidelying HABD, 4#, 3x10   Prone row, 9#, 3x10   Prone ext, 6#, 3x10   Prone T, 4#, 3x10   Prone Y, 3#, 3x10   Push ups at barre, 3x10   Resisted anchored B HABD, BluTB, 2x10   Resisted anchored B diagonals, BluTB, 2x10 ea   Tricep press down, black tubing, 3x10   Bicep curl, black tubing, 3x10   Standing IYT, 2#, 3x10   Resisted bilat ER, GTB, 3x10   Biceps stretch, 2x30\" in door   Posterior cuff stretch, 2x30\"    UBE 3' fwd/3' bwd (L 5.0)  TRX       Flex 10x10 secs       Horiz Abd 10x10 secs       Ext 10x10 secs  3 Way Shoulder Raises 2x10 ea 2#  OH Press 3x10 2#  Biceps Curls 3x10 5#  B Shoulder ER 3x10 GTB  Sport Cord Shoulder IR/ER 3x10 B  PROM R Shoulder 8 mins UBE 3' fwd/3' bwd (L 5.0)  TRX       Flex 10x10 secs       Horiz Abd 10x10 secs       Ext 10x10 secs  3 Way Shoulder Raises 3x10 ea 2#  OH Press 3x10 3#  Biceps Curls 3x10 6#  B Shoulder ER 3x10 GTB  Sport Cord Shoulder IR/ER 3x10 B UBE 3' fwd/3' bwd (L 5.0)  TRX       Flex 10x10 secs       Horiz Abd 10x10 secs       Ext 10x10 secs  3 Way Shoulder Raises 3x10 ea 3#  OH Press 3x10 4#  Biceps Curls 3x10 7#  B  Shoulder ER 3x10 BTB  Sport Cord Shoulder IR/ER 3x10 B  Table Pushups 3x10  High Plank 3x30 secs  Reassessment for PN   Neuro Re-Education RS supine 60, 90, 120 deg flexion, 1x30\"   4 way body blade, 2x30\" ea  MB Scapular Stabilization        Flex/Ext x30       M/L x30       Circles (CW/CCW) x30 ea       ABCs x2  Sport Cord Rows 3x10 Blk  Sport Cord Ext 3x10 B  Sport Cord Horiz Abd 3x10 G MB Scapular Stabilization        Flex/Ext x30       M/L x30       Circles (CW/CCW) x30 ea       ABCs x2  Sport Cord Rows 3x10 orange  Sport Cord Ext 3x10 Blk  Sport Cord Triceps Ext 3x10 Blk  Sport Cord Horiz Abd 3x10 B  Body Blade 4 Way 2x30 secs ea MB Scapular Stabilization        Flex/Ext x30       M/L x30       Circles (CW/CCW) x30 ea       ABCs x2  Sport Cord Rows 3x10 orange  Sport Cord Ext 3x10 Blk  Sport Cord Triceps Ext 3x10 Blk  Sport Cord Horiz Abd 3x10 B  Body Blade 4 Way 2x30 secs ea     Manual Therapy   IASTM to the R biceps and lat shoulder musculature 10 mins    Therapeutic Exercise Minutes 45 35 23 23   Neuro Re-Educ Minutes 5 12 20 23   Manual Therapy Minutes   10    Total Time Of Timed Procedures 50 47 53 46   Total Time Of Service-Based Procedures 0 0 0 0   Total Treatment Time 50 47 53 46        HEP  Access Code: [email or web address]  URL: [email or web address]  Date: 05/21/2025  Prepared by: Shelby Larsen    Exercises  - Standing Single Shoulder Flexion Wall Slide with Palm Up  - 2 x daily - 7 x weekly - 3 sets - 10 reps  - Standing Shoulder Abduction Slides at Wall  - 2 x daily - 7 x weekly - 3 sets - 10 reps  - Sidelying Shoulder External Rotation  - 1 x daily - 7 x weekly - 3 sets - 10 reps - 2-5 #  - Sidelying Shoulder Horizontal Abduction  - 1 x daily - 7 x weekly - 3 sets - 10 reps - 2-5 #  - Standing Shoulder Row with Anchored Resistance  - 1 x daily - 7 x weekly - 3 sets - 10 reps - green band  - Shoulder extension with resistance - Neutral  - 1 x daily - 7 x weekly - 2 sets - 15 reps - green  band  - Standing Shoulder Horizontal Abduction with Anchored Resistance  - 1 x daily - 7 x weekly - 3 sets - 10 reps - green band    Charges  Therapeutic Ex x2, Neuromuscular Re-ed x1

## 2025-06-24 NOTE — PROGRESS NOTES
Patient: Amando Beatty (19 year old, male) Referring Provider:  Insurance:   Diagnosis: Tear of right glenoid labrum, subsequent encounter (S43.341R) Jesus Daley  [facility] MEDICAID   Date of Surgery: 4/1/25 Next MD visit:  N/A   Precautions:    6/18/25 Referral Information:    Date of Evaluation: Req: 12, Auth: 12, Exp: 7/20/2025 05/21/25 POC Auth Visits:          Today's Date   6/24/2025    Subjective  Pt reports that the MD is happy with his progress and was given the okay to start light weight resistance training at the gym.       Pain: 0/10     Objective  See flowsheet for details.       Assessment  Pt cont to progress very well in Physical Therapy with both mobility and strengthening.  Pt does cont to report muscle fatigue with UE strengthening today, yet no pain was present.  Pt demonstrated good form with all exercises.  Will cont to progression functional mobility and strength to enable return to weight lifting and sport-specific exercises.    Goals (to be met in 24 visits)   Goals: (To be met in 8-10 weeks post op )       Not Met Progress Toward Partially Met Met   Pt will improve shoulder flexion AROM to >150 degrees to be able to reach into overhead cabinets without pain or restriction []  []  []  [x]    Pt will improve shoulder abduction AROM to >130 degrees to improve ability to don deodorant, don/doff shirts, and wash hair  []  []  []  [x]    Pt will increase shoulder AROM ER to 80 degrees to be able to reach and fasten seatbelt  []  []  []  [x]    Pt will increase shoulder AROM IR to 70 degrees to be able to reach in back pocket, tuck in shirt, and turn steering wheel without pain []  []  []  [x]    Pt will be independent and compliant with comprehensive HEP to maintain progress achieved in PT  []  []  []  [x]          Goals: (To be met 4-6 months post op        Not Met Progress Toward Partially Met Met   Pt will improve shoulder strength throughout to 5/5 to improve function with return to  sport []  [x]  []  []    Pt will demonstrate increased mid/low trap strength to 5/5 to promote improved shoulder mechanics and stabilization with lifting and reaching []  [x]  []  []    Pt will be independent and compliant with comprehensive HEP to maintain progress achieved in PT  []  [x]  []  []        Plan  Will add DB chest press next session.    Treatment Last 4 Visits  Treatment Day: 9       6/10/2025 6/12/2025 6/16/2025 6/24/2025   UE Treatment   Therapeutic Exercise UBE 3' fwd/3' bwd (L 5.0)  TRX       Flex 10x10 secs       Horiz Abd 10x10 secs       Ext 10x10 secs  3 Way Shoulder Raises 2x10 ea 2#  OH Press 3x10 2#  Biceps Curls 3x10 5#  B Shoulder ER 3x10 GTB  Sport Cord Shoulder IR/ER 3x10 B  PROM R Shoulder 8 mins UBE 3' fwd/3' bwd (L 5.0)  TRX       Flex 10x10 secs       Horiz Abd 10x10 secs       Ext 10x10 secs  3 Way Shoulder Raises 3x10 ea 2#  OH Press 3x10 3#  Biceps Curls 3x10 6#  B Shoulder ER 3x10 GTB  Sport Cord Shoulder IR/ER 3x10 B UBE 3' fwd/3' bwd (L 5.0)  TRX       Flex 10x10 secs       Horiz Abd 10x10 secs       Ext 10x10 secs  3 Way Shoulder Raises 3x10 ea 3#  OH Press 3x10 4#  Biceps Curls 3x10 7#  B Shoulder ER 3x10 BTB  Sport Cord Shoulder IR/ER 3x10 B  Table Pushups 3x10  High Plank 3x30 secs  Reassessment for PN UBE 3' fwd/3' bwd (L 5.0)  Doorway Pecs Stretch 3x30 secs  TRX       Flex 10x10 secs       Horiz Abd 10x10 secs       Ext 10x10 secs  3 Way Shoulder Raises 3x10 ea 3#  OH Press 3x10 4#  Biceps Curls 3x10 7#  B Shoulder ER 3x10 BTB  Sport Cord Shoulder IR/ER 3x10 B  Table Pushups 3x10  Lpw Plank 3x30 secs   Neuro Re-Education MB Scapular Stabilization        Flex/Ext x30       M/L x30       Circles (CW/CCW) x30 ea       ABCs x2  Sport Cord Rows 3x10 Blk  Sport Cord Ext 3x10 B  Sport Cord Horiz Abd 3x10 G MB Scapular Stabilization        Flex/Ext x30       M/L x30       Circles (CW/CCW) x30 ea       ABCs x2  Sport Cord Rows 3x10 orange  Sport Cord Ext 3x10 Blk  Sport Cord  Triceps Ext 3x10 Blk  Sport Cord Horiz Abd 3x10 B  Body Blade 4 Way 2x30 secs ea MB Scapular Stabilization        Flex/Ext x30       M/L x30       Circles (CW/CCW) x30 ea       ABCs x2  Sport Cord Rows 3x10 orange  Sport Cord Ext 3x10 Blk  Sport Cord Triceps Ext 3x10 Blk  Sport Cord Horiz Abd 3x10 B  Body Blade 4 Way 2x30 secs ea   MB Scapular Stabilization        Flex/Ext x30       M/L x30       Circles (CW/CCW) x30 ea       ABCs x2  Sport Cord Rows 3x10 orange  Sport Cord Ext 3x10 Blk  Sport Cord Triceps Ext 3x10 Blk  Sport Cord Horiz Abd 3x10 B  Body Blade 4 Way 2x30 secs ea     Manual Therapy  IASTM to the R biceps and lat shoulder musculature 10 mins     Therapeutic Exercise Minutes 35 23 23 25   Neuro Re-Educ Minutes 12 20 23 25   Manual Therapy Minutes  10     Total Time Of Timed Procedures 47 53 46 50   Total Time Of Service-Based Procedures 0 0 0 0   Total Treatment Time 47 53 46 50        HEP  Access Code: [email or web address]  URL: [email or web address]  Date: 05/21/2025  Prepared by: Shelby Larsen    Exercises  - Standing Single Shoulder Flexion Wall Slide with Palm Up  - 2 x daily - 7 x weekly - 3 sets - 10 reps  - Standing Shoulder Abduction Slides at Wall  - 2 x daily - 7 x weekly - 3 sets - 10 reps  - Sidelying Shoulder External Rotation  - 1 x daily - 7 x weekly - 3 sets - 10 reps - 2-5 #  - Sidelying Shoulder Horizontal Abduction  - 1 x daily - 7 x weekly - 3 sets - 10 reps - 2-5 #  - Standing Shoulder Row with Anchored Resistance  - 1 x daily - 7 x weekly - 3 sets - 10 reps - green band  - Shoulder extension with resistance - Neutral  - 1 x daily - 7 x weekly - 2 sets - 15 reps - green band  - Standing Shoulder Horizontal Abduction with Anchored Resistance  - 1 x daily - 7 x weekly - 3 sets - 10 reps - green band    Charges  Therapeutic Ex x2, Neuromuscular Re-ed x2

## 2025-07-01 NOTE — PROGRESS NOTES
Patient: Amando Beatty (19 year old, male) Referring Provider:  Insurance:   Diagnosis: Tear of right glenoid labrum, subsequent encounter (J96.144U) Jesus Daley  [facility] MEDICAID   Date of Surgery: 4/1/25 Next MD visit:  N/A   Precautions:    6/18/25 Referral Information:    Date of Evaluation: Req: 12, Auth: 12, Exp: 7/20/2025 05/21/25 POC Auth Visits:          Today's Date   7/1/2025    Subjective  \"Feeling pretty good; no pain.\"       Pain: 0/10     Objective  See flowsheet for details.       Assessment  Pt was able to complete all given exercises today without increased c/o pain.  Pt tolerated increased resistance with shoulder raises, OH press, and biceps curls.  Pt did report mild discomfort in the biceps during biceps curls.  Challenged core strength with LE lift during low plank hold; muscla fatigue was noted.  Also added DB chest press today without any difficulty.  Will cont to progress POC as able.    Goals (to be met in 24 visits)   Goals: (To be met in 8-10 weeks post op )       Not Met Progress Toward Partially Met Met   Pt will improve shoulder flexion AROM to >150 degrees to be able to reach into overhead cabinets without pain or restriction []  []  []  [x]    Pt will improve shoulder abduction AROM to >130 degrees to improve ability to don deodorant, don/doff shirts, and wash hair  []  []  []  [x]    Pt will increase shoulder AROM ER to 80 degrees to be able to reach and fasten seatbelt  []  []  []  [x]    Pt will increase shoulder AROM IR to 70 degrees to be able to reach in back pocket, tuck in shirt, and turn steering wheel without pain []  []  []  [x]    Pt will be independent and compliant with comprehensive HEP to maintain progress achieved in PT  []  []  []  [x]          Goals: (To be met 4-6 months post op        Not Met Progress Toward Partially Met Met   Pt will improve shoulder strength throughout to 5/5 to improve function with return to sport []  [x]  []  []    Pt will  demonstrate increased mid/low trap strength to 5/5 to promote improved shoulder mechanics and stabilization with lifting and reaching []  [x]  []  []    Pt will be independent and compliant with comprehensive HEP to maintain progress achieved in PT  []  [x]  []  []        Plan  Will cont ot focus on shoulder strength and stability as tolerated.    Treatment Last 4 Visits  Treatment Day: 10       6/12/2025 6/16/2025 6/24/2025 7/1/2025   UE Treatment   Therapeutic Exercise UBE 3' fwd/3' bwd (L 5.0)  TRX       Flex 10x10 secs       Horiz Abd 10x10 secs       Ext 10x10 secs  3 Way Shoulder Raises 3x10 ea 2#  OH Press 3x10 3#  Biceps Curls 3x10 6#  B Shoulder ER 3x10 GTB  Sport Cord Shoulder IR/ER 3x10 B UBE 3' fwd/3' bwd (L 5.0)  TRX       Flex 10x10 secs       Horiz Abd 10x10 secs       Ext 10x10 secs  3 Way Shoulder Raises 3x10 ea 3#  OH Press 3x10 4#  Biceps Curls 3x10 7#  B Shoulder ER 3x10 BTB  Sport Cord Shoulder IR/ER 3x10 B  Table Pushups 3x10  High Plank 3x30 secs  Reassessment for PN UBE 3' fwd/3' bwd (L 5.0)  Doorway Pecs Stretch 3x30 secs  TRX       Flex 10x10 secs       Horiz Abd 10x10 secs       Ext 10x10 secs  3 Way Shoulder Raises 3x10 ea 3#  OH Press 3x10 4#  Biceps Curls 3x10 7#  B Shoulder ER 3x10 BTB  Sport Cord Shoulder IR/ER 3x10 B  Table Pushups 3x10  Lpw Plank 3x30 secs UBE 3' fwd/3' bwd (L 5.0)  Doorway Pecs Stretch 3x30 secs  TRX       Flex 10x10 secs       Horiz Abd 10x10 secs       Ext 10x10 secs  3 Way Shoulder Raises 3x10 ea 4#  OH Press 3x10 5#  Biceps Curls 3x10 8#  B Shoulder ER 3x10 BTB  Sport Cord Shoulder IR/ER 3x10 B  Table Pushups 3x10  Low Plank w/ Alt leg lift 3x30 secs  DB Chest Press 3x10 10# ea arm   Neuro Re-Education MB Scapular Stabilization        Flex/Ext x30       M/L x30       Circles (CW/CCW) x30 ea       ABCs x2  Sport Cord Rows 3x10 orange  Sport Cord Ext 3x10 Blk  Sport Cord Triceps Ext 3x10 Blk  Sport Cord Horiz Abd 3x10 B  Body Blade 4 Way 2x30 secs kym MB Scapular  Stabilization        Flex/Ext x30       M/L x30       Circles (CW/CCW) x30 ea       ABCs x2  Sport Cord Rows 3x10 orange  Sport Cord Ext 3x10 Blk  Sport Cord Triceps Ext 3x10 Blk  Sport Cord Horiz Abd 3x10 B  Body Blade 4 Way 2x30 secs ea   MB Scapular Stabilization        Flex/Ext x30       M/L x30       Circles (CW/CCW) x30 ea       ABCs x2  Sport Cord Rows 3x10 orange  Sport Cord Ext 3x10 Blk  Sport Cord Triceps Ext 3x10 Blk  Sport Cord Horiz Abd 3x10 B  Body Blade 4 Way 2x30 secs ea   MB Scapular Stabilization        Flex/Ext x30       M/L x30       Circles (CW/CCW) x30 ea       ABCs x2  Sport Cord Rows 3x10 orange  Sport Cord Ext 3x10 Blk  Sport Cord Triceps Ext 3x10 Blk  Sport Cord Horiz Abd 3x10 B  Body Blade 4 Way 2x30 secs ea     Manual Therapy IASTM to the R biceps and lat shoulder musculature 10 mins      Therapeutic Exercise Minutes 23 23 25 40   Neuro Re-Educ Minutes 20 23 25 10   Manual Therapy Minutes 10      Total Time Of Timed Procedures 53 46 50 50   Total Time Of Service-Based Procedures 0 0 0 0   Total Treatment Time 53 46 50 50        HEP  Access Code: [email or web address]  URL: [email or web address]  Date: 05/21/2025  Prepared by: Shelby Larsen    Exercises  - Standing Single Shoulder Flexion Wall Slide with Palm Up  - 2 x daily - 7 x weekly - 3 sets - 10 reps  - Standing Shoulder Abduction Slides at Wall  - 2 x daily - 7 x weekly - 3 sets - 10 reps  - Sidelying Shoulder External Rotation  - 1 x daily - 7 x weekly - 3 sets - 10 reps - 2-5 #  - Sidelying Shoulder Horizontal Abduction  - 1 x daily - 7 x weekly - 3 sets - 10 reps - 2-5 #  - Standing Shoulder Row with Anchored Resistance  - 1 x daily - 7 x weekly - 3 sets - 10 reps - green band  - Shoulder extension with resistance - Neutral  - 1 x daily - 7 x weekly - 2 sets - 15 reps - green band  - Standing Shoulder Horizontal Abduction with Anchored Resistance  - 1 x daily - 7 x weekly - 3 sets - 10 reps - green  band    Charges  Therapeutic Ex x3, Neuromuscular Re-ed x1

## 2025-07-08 NOTE — PROGRESS NOTES
Patient: Amando Beatty (19 year old, male) Referring Provider:  Insurance:   Diagnosis: Tear of right glenoid labrum, subsequent encounter (V99.807X) Jesus Daley  [facility] MEDICAID   Date of Surgery: 4/1/25 Next MD visit:  N/A   Precautions:    6/18/25 Referral Information:    Date of Evaluation: Req: 12, Auth: 12, Exp: 7/20/2025 05/21/25 POC Auth Visits:          Today's Date   7/8/2025    Subjective  Pt reports that his shoulder is doing well.       Pain: 0/10     Objective  See flowsheet for details.       Assessment  Pt cont to progress steadily in Physical Therapy towards all functional goals.  Pt is challenged with advance shoulder stability exercises due to weakness/muscle fatigue.  No pain reported throughout today's session.  Will cont to progress POC as able.    Goals (to be met in 24 visits)   Goals: (To be met in 8-10 weeks post op )       Not Met Progress Toward Partially Met Met   Pt will improve shoulder flexion AROM to >150 degrees to be able to reach into overhead cabinets without pain or restriction []  []  []  [x]    Pt will improve shoulder abduction AROM to >130 degrees to improve ability to don deodorant, don/doff shirts, and wash hair  []  []  []  [x]    Pt will increase shoulder AROM ER to 80 degrees to be able to reach and fasten seatbelt  []  []  []  [x]    Pt will increase shoulder AROM IR to 70 degrees to be able to reach in back pocket, tuck in shirt, and turn steering wheel without pain []  []  []  [x]    Pt will be independent and compliant with comprehensive HEP to maintain progress achieved in PT  []  []  []  [x]          Goals: (To be met 4-6 months post op        Not Met Progress Toward Partially Met Met   Pt will improve shoulder strength throughout to 5/5 to improve function with return to sport []  [x]  []  []    Pt will demonstrate increased mid/low trap strength to 5/5 to promote improved shoulder mechanics and stabilization with lifting and reaching []  [x]  []  []     Pt will be independent and compliant with comprehensive HEP to maintain progress achieved in PT  []  [x]  []  []        Plan  Will cont to focus on shoulder strength and stability as tolerated.    Treatment Last 4 Visits  Treatment Day: 11 6/16/2025 6/24/2025 7/1/2025 7/8/2025   UE Treatment   Therapeutic Exercise UBE 3' fwd/3' bwd (L 5.0)  TRX       Flex 10x10 secs       Horiz Abd 10x10 secs       Ext 10x10 secs  3 Way Shoulder Raises 3x10 ea 3#  OH Press 3x10 4#  Biceps Curls 3x10 7#  B Shoulder ER 3x10 BTB  Sport Cord Shoulder IR/ER 3x10 B  Table Pushups 3x10  High Plank 3x30 secs  Reassessment for PN UBE 3' fwd/3' bwd (L 5.0)  Doorway Pecs Stretch 3x30 secs  TRX       Flex 10x10 secs       Horiz Abd 10x10 secs       Ext 10x10 secs  3 Way Shoulder Raises 3x10 ea 3#  OH Press 3x10 4#  Biceps Curls 3x10 7#  B Shoulder ER 3x10 BTB  Sport Cord Shoulder IR/ER 3x10 B  Table Pushups 3x10  Lpw Plank 3x30 secs UBE 3' fwd/3' bwd (L 5.0)  Doorway Pecs Stretch 3x30 secs  TRX       Flex 10x10 secs       Horiz Abd 10x10 secs       Ext 10x10 secs  3 Way Shoulder Raises 3x10 ea 4#  OH Press 3x10 5#  Biceps Curls 3x10 8#  B Shoulder ER 3x10 BTB  Sport Cord Shoulder IR/ER 3x10 B  Table Pushups 3x10  Low Plank w/ Alt leg lift 3x30 secs  DB Chest Press 3x10 10# ea arm UBE 3' fwd/3' bwd (L 5.0)  Doorway Pecs Stretch 3x30 secs  TRX       Flex 10x10 secs       Horiz Abd 10x10 secs       Ext 10x10 secs  3 Way Shoulder Raises 3x10 ea 4#  OH Press 3x10 5#  Biceps Curls 3x10 8#  B Shoulder ER 3x10 BTB  Sport Cord Shoulder IR/ER 3x10 B  Table Pushups 3x10  Low Plank w/ Alt leg lift 3x30 secs  UE Step Ups in Plank x10 ea       Neuro Re-Education MB Scapular Stabilization        Flex/Ext x30       M/L x30       Circles (CW/CCW) x30 ea       ABCs x2  Sport Cord Rows 3x10 orange  Sport Cord Ext 3x10 Blk  Sport Cord Triceps Ext 3x10 Blk  Sport Cord Horiz Abd 3x10 B  Body Blade 4 Way 2x30 secs ea   MB Scapular Stabilization         Flex/Ext x30       M/L x30       Circles (CW/CCW) x30 ea       ABCs x2  Sport Cord Rows 3x10 orange  Sport Cord Ext 3x10 Blk  Sport Cord Triceps Ext 3x10 Blk  Sport Cord Horiz Abd 3x10 B  Body Blade 4 Way 2x30 secs ea   MB Scapular Stabilization        Flex/Ext x30       M/L x30       Circles (CW/CCW) x30 ea       ABCs x2  Sport Cord Rows 3x10 orange  Sport Cord Ext 3x10 Blk  Sport Cord Triceps Ext 3x10 Blk  Sport Cord Horiz Abd 3x10 B  Body Blade 4 Way 2x30 secs ea   MB Scapular Stabilization        Flex/Ext x30       M/L x30       Circles (CW/CCW) x30 ea       ABCs x2  Sport Cord Rows 3x10 orange  Sport Cord Ext 3x10 Blk  Sport Cord Triceps Ext 3x10 Blk  Sport Cord Horiz Abd 3x10 B  Body Blade 4 Way 2x30 secs ea     Therapeutic Exercise Minutes 23 25 40 25   Neuro Re-Educ Minutes 23 25 10 25   Total Time Of Timed Procedures 46 50 50 50   Total Time Of Service-Based Procedures 0 0 0 0   Total Treatment Time 46 50 50 50        HEP  Access Code: [email or web address]  URL: [email or web address]  Date: 05/21/2025  Prepared by: Shelby Larsen    Exercises  - Standing Single Shoulder Flexion Wall Slide with Palm Up  - 2 x daily - 7 x weekly - 3 sets - 10 reps  - Standing Shoulder Abduction Slides at Wall  - 2 x daily - 7 x weekly - 3 sets - 10 reps  - Sidelying Shoulder External Rotation  - 1 x daily - 7 x weekly - 3 sets - 10 reps - 2-5 #  - Sidelying Shoulder Horizontal Abduction  - 1 x daily - 7 x weekly - 3 sets - 10 reps - 2-5 #  - Standing Shoulder Row with Anchored Resistance  - 1 x daily - 7 x weekly - 3 sets - 10 reps - green band  - Shoulder extension with resistance - Neutral  - 1 x daily - 7 x weekly - 2 sets - 15 reps - green band  - Standing Shoulder Horizontal Abduction with Anchored Resistance  - 1 x daily - 7 x weekly - 3 sets - 10 reps - green band    Charges  Therapeutic Ex x2, Neuromuscular Re-ed x2

## 2025-07-12 NOTE — PROGRESS NOTES
Patient: Amando Beatty (19 year old, male) Referring Provider:  Insurance:   Diagnosis: Tear of right glenoid labrum, subsequent encounter (X40.924R) Jesus Daley  [place] [employer] MEDICAID   Date of Surgery: 4/1/25 Next MD visit:  N/A   Precautions:    6/18/25 Referral Information:    Date of Evaluation: Req: 12, Auth: 12, Exp: 7/20/2025 05/21/25 POC Auth Visits:          Today's Date   7/11/2025    Subjective  \"Just a little soreness in the shoulder.\"       Pain: 0/10     Objective  See flowsheet for details.       Assessment  Pt demonstrated improved tolerance to dynamic UE stability exercises, but cont to demonstrate mod muscle fatigue.  No pain reported throughout today's session.  Will cont to progress POC as able.    Goals (to be met in 24 visits)   Goals: (To be met in 8-10 weeks post op )       Not Met Progress Toward Partially Met Met   Pt will improve shoulder flexion AROM to >150 degrees to be able to reach into overhead cabinets without pain or restriction []  []  []  [x]    Pt will improve shoulder abduction AROM to >130 degrees to improve ability to don deodorant, don/doff shirts, and wash hair  []  []  []  [x]    Pt will increase shoulder AROM ER to 80 degrees to be able to reach and fasten seatbelt  []  []  []  [x]    Pt will increase shoulder AROM IR to 70 degrees to be able to reach in back pocket, tuck in shirt, and turn steering wheel without pain []  []  []  [x]    Pt will be independent and compliant with comprehensive HEP to maintain progress achieved in PT  []  []  []  [x]          Goals: (To be met 4-6 months post op        Not Met Progress Toward Partially Met Met   Pt will improve shoulder strength throughout to 5/5 to improve function with return to sport []  [x]  []  []    Pt will demonstrate increased mid/low trap strength to 5/5 to promote improved shoulder mechanics and stabilization with lifting and reaching []  [x]  []  []    Pt will be independent and compliant with comprehensive  HEP to maintain progress achieved in PT  []  [x]  []  []        Plan  Will cont to focus on shoulder strength and stability as tolerated.    Treatment Last 4 Visits  Treatment Day: 12 6/24/2025 7/1/2025 7/8/2025 7/11/2025   UE Treatment   Therapeutic Exercise UBE 3' fwd/3' bwd (L 5.0)  Doorway Pecs Stretch 3x30 secs  TRX       Flex 10x10 secs       Horiz Abd 10x10 secs       Ext 10x10 secs  3 Way Shoulder Raises 3x10 ea 3#  OH Press 3x10 4#  Biceps Curls 3x10 7#  B Shoulder ER 3x10 BTB  Sport Cord Shoulder IR/ER 3x10 B  Table Pushups 3x10  Lpw Plank 3x30 secs UBE 3' fwd/3' bwd (L 5.0)  Doorway Pecs Stretch 3x30 secs  TRX       Flex 10x10 secs       Horiz Abd 10x10 secs       Ext 10x10 secs  3 Way Shoulder Raises 3x10 ea 4#  OH Press 3x10 5#  Biceps Curls 3x10 8#  B Shoulder ER 3x10 BTB  Sport Cord Shoulder IR/ER 3x10 B  Table Pushups 3x10  Low Plank w/ Alt leg lift 3x30 secs  DB Chest Press 3x10 10# ea arm UBE 3' fwd/3' bwd (L 5.0)  Doorway Pecs Stretch 3x30 secs  TRX       Flex 10x10 secs       Horiz Abd 10x10 secs       Ext 10x10 secs  3 Way Shoulder Raises 3x10 ea 4#  OH Press 3x10 5#  Biceps Curls 3x10 8#  B Shoulder ER 3x10 BTB  Sport Cord Shoulder IR/ER 3x10 B  Table Pushups 3x10  Low Plank w/ Alt leg lift 3x30 secs  UE Step Ups in Plank x10 ea     UBE 3' fwd/3' bwd (L 5.0)  Doorway Pecs Stretch 3x30 secs  TRX       Flex 10x10 secs       Horiz Abd 10x10 secs       Ext 10x10 secs  3 Way Shoulder Raises 3x10 ea 4#  OH Press 3x10 5#  Biceps Curls 3x10 8#  B Shoulder ER 3x10 BTB  Sport Cord Shoulder IR/ER 3x10 B  Table Pushups 3x10  Low Plank w/ Alt leg lift 3x30 secs  UE Step Ups in Plank 4\" x10 ea  UE Step Overs in Plank 4\" x5  Wall Swims 2x10 YTB  UE Wall Walks x5 laps YTB   Neuro Re-Education MB Scapular Stabilization        Flex/Ext x30       M/L x30       Circles (CW/CCW) x30 ea       ABCs x2  Sport Cord Rows 3x10 orange  Sport Cord Ext 3x10 Blk  Sport Cord Triceps Ext 3x10 Blk  Sport Cord Horiz Abd  3x10 B  Body Blade 4 Way 2x30 secs ea   MB Scapular Stabilization        Flex/Ext x30       M/L x30       Circles (CW/CCW) x30 ea       ABCs x2  Sport Cord Rows 3x10 orange  Sport Cord Ext 3x10 Blk  Sport Cord Triceps Ext 3x10 Blk  Sport Cord Horiz Abd 3x10 B  Body Blade 4 Way 2x30 secs ea   MB Scapular Stabilization        Flex/Ext x30       M/L x30       Circles (CW/CCW) x30 ea       ABCs x2  Sport Cord Rows 3x10 orange  Sport Cord Ext 3x10 Blk  Sport Cord Triceps Ext 3x10 Blk  Sport Cord Horiz Abd 3x10 B  Body Blade 4 Way 2x30 secs ea   MB Scapular Stabilization        Flex/Ext x30       M/L x30       Circles (CW/CCW) x30 ea       ABCs x2  Sport Cord Rows 3x10 orange  Sport Cord Ext 3x10 Blk  Sport Cord Triceps Ext 3x10 Blk  Sport Cord Horiz Abd 3x10 B   Therapeutic Exercise Minutes 25 40 25 43   Neuro Re-Educ Minutes 25 10 25 10   Total Time Of Timed Procedures 50 50 50 53   Total Time Of Service-Based Procedures 0 0 0 0   Total Treatment Time 50 50 50 53        HEP  Access Code: [email or web address]  URL: [email or web address]  Date: 05/21/2025  Prepared by: Shelby Larsen    Exercises  - Standing Single Shoulder Flexion Wall Slide with Palm Up  - 2 x daily - 7 x weekly - 3 sets - 10 reps  - Standing Shoulder Abduction Slides at Wall  - 2 x daily - 7 x weekly - 3 sets - 10 reps  - Sidelying Shoulder External Rotation  - 1 x daily - 7 x weekly - 3 sets - 10 reps - 2-5 #  - Sidelying Shoulder Horizontal Abduction  - 1 x daily - 7 x weekly - 3 sets - 10 reps - 2-5 #  - Standing Shoulder Row with Anchored Resistance  - 1 x daily - 7 x weekly - 3 sets - 10 reps - green band  - Shoulder extension with resistance - Neutral  - 1 x daily - 7 x weekly - 2 sets - 15 reps - green band  - Standing Shoulder Horizontal Abduction with Anchored Resistance  - 1 x daily - 7 x weekly - 3 sets - 10 reps - green band    Charges  Therapeutic Ex x3, Neuromuscular Re-ed x1

## 2025-07-17 NOTE — PROGRESS NOTES
Patient: Amando Beatty (19 year old, male) Referring Provider:  Insurance:   Diagnosis: Tear of right glenoid labrum, subsequent encounter (X43.093R) Jesus Daley  [place] [employer] MEDICAID   Date of Surgery: 4/1/25 Next MD visit:  N/A   Precautions:    6/18/25 Referral Information:    Date of Evaluation: Req: 2, Auth: 2, Exp: 9/12/2025 05/21/25 POC Auth Visits:          Today's Date   7/17/2025    Subjective  Shoulder is doing well. Going back to school on August 1st. Has rehab with [facility] set up on the first day he returns.       Pain: 0/10     Objective  See flowsheet for details.          Assessment  Pt continues to require rest breaks for scapular and RC stabilization exercises in clinic due to muscle fatigue.    Goals (to be met in 24 visits)   Goals: (To be met in 8-10 weeks post op )      Not Met Progress Toward Partially Met Met   Pt will improve shoulder flexion AROM to >150 degrees to be able to reach into overhead cabinets without pain or restriction []  []  []  []    Pt will improve shoulder abduction AROM to >130 degrees to improve ability to don deodorant, don/doff shirts, and wash hair  []  []  []  []    Pt will increase shoulder AROM ER to 80 degrees to be able to reach and fasten seatbelt  []  []  []  []    Pt will increase shoulder AROM IR to 70 degrees to be able to reach in back pocket, tuck in shirt, and turn steering wheel without pain []  []  []  []    Pt will be independent and compliant with comprehensive HEP to maintain progress achieved in PT  []  []  []  []         Goals: (To be met 4-6 months post op       Not Met Progress Toward Partially Met Met   Pt will improve shoulder strength throughout to 5/5 to improve function with return to sport []  []  []  []    Pt will demonstrate increased mid/low trap strength to 5/5 to promote improved shoulder mechanics and stabilization with lifting and reaching []  []  []  []    Pt will be independent and compliant with comprehensive HEP to maintain  progress achieved in PT  []  []  []  []                               Plan  assess tolerance to weight progressions in clinic. Maybe attempt body blade next. PN FOR EVICORE NEXT    Treatment Last 4 Visits  Treatment Day: 13       7/1/2025 7/8/2025 7/11/2025 7/17/2025   UE Treatment   Therapeutic Exercise UBE 3' fwd/3' bwd (L 5.0)  Doorway Pecs Stretch 3x30 secs  TRX       Flex 10x10 secs       Horiz Abd 10x10 secs       Ext 10x10 secs  3 Way Shoulder Raises 3x10 ea 4#  OH Press 3x10 5#  Biceps Curls 3x10 8#  B Shoulder ER 3x10 BTB  Sport Cord Shoulder IR/ER 3x10 B  Table Pushups 3x10  Low Plank w/ Alt leg lift 3x30 secs  DB Chest Press 3x10 10# ea arm UBE 3' fwd/3' bwd (L 5.0)  Doorway Pecs Stretch 3x30 secs  TRX       Flex 10x10 secs       Horiz Abd 10x10 secs       Ext 10x10 secs  3 Way Shoulder Raises 3x10 ea 4#  OH Press 3x10 5#  Biceps Curls 3x10 8#  B Shoulder ER 3x10 BTB  Sport Cord Shoulder IR/ER 3x10 B  Table Pushups 3x10  Low Plank w/ Alt leg lift 3x30 secs  UE Step Ups in Plank x10 ea     UBE 3' fwd/3' bwd (L 5.0)  Doorway Pecs Stretch 3x30 secs  TRX       Flex 10x10 secs       Horiz Abd 10x10 secs       Ext 10x10 secs  3 Way Shoulder Raises 3x10 ea 4#  OH Press 3x10 5#  Biceps Curls 3x10 8#  B Shoulder ER 3x10 BTB  Sport Cord Shoulder IR/ER 3x10 B  Table Pushups 3x10  Low Plank w/ Alt leg lift 3x30 secs  UE Step Ups in Plank 4\" x10 ea  UE Step Overs in Plank 4\" x5  Wall Swims 2x10 YTB  UE Wall Walks x5 laps YTB UBE 3' fwd/3' bwd (L 5.0)   Doorway Pecs Stretch 3x30 secs   TRX       Flex 10x10 secs        Horiz Abd 10x10 secs        Ext 10x10 secs   3 Way Shoulder Raises 3x10 ea 5#   OH Press 3x10 6#   Biceps Curls 3x10 9#   Sport Cord Shoulder IR/ER   3x10 B B Shoulder ER 3x10 BTB     Table Pushups 3x10   Low Plank w/ Alt leg lift 3x30 secs   UE Step Ups in Plank 4\" x10 ea   UE Step Overs in Plank 4\" x5   Wall Swims 2x10 YTB   UE Wall Walks x5 laps YTB      Neuro Re-Education MB Scapular Stabilization         Flex/Ext x30       M/L x30       Circles (CW/CCW) x30 ea       ABCs x2  Sport Cord Rows 3x10 orange  Sport Cord Ext 3x10 Blk  Sport Cord Triceps Ext 3x10 Blk  Sport Cord Horiz Abd 3x10 B  Body Blade 4 Way 2x30 secs ea   MB Scapular Stabilization        Flex/Ext x30       M/L x30       Circles (CW/CCW) x30 ea       ABCs x2  Sport Cord Rows 3x10 orange  Sport Cord Ext 3x10 Blk  Sport Cord Triceps Ext 3x10 Blk  Sport Cord Horiz Abd 3x10 B  Body Blade 4 Way 2x30 secs ea   MB Scapular Stabilization        Flex/Ext x30       M/L x30       Circles (CW/CCW) x30 ea       ABCs x2  Sport Cord Rows 3x10 orange  Sport Cord Ext 3x10 Blk  Sport Cord Triceps Ext 3x10 Blk  Sport Cord Horiz Abd 3x10 B MB Scapular Stabilization        Flex/Ext x30        M/L x30        Circles (CW/CCW) x30 ea        ABCs x2   Sport Cord Rows 3x10 orange   Sport Cord Ext 3x10 Blk   Sport Cord Triceps Ext 3x10 Blk   Sport Cord Horiz Abd 3x10 B blue      Therapeutic Exercise Minutes 40 25 43 43   Neuro Re-Educ Minutes 10 25 10 10   Total Time Of Timed Procedures 50 50 53 53   Total Time Of Service-Based Procedures 0 0 0 0   Total Treatment Time 50 50 53 53        HEP  Access Code: [email or web address]  URL: [email or web address]  Date: 05/21/2025  Prepared by: Shelby Larsen    Exercises  - Standing Single Shoulder Flexion Wall Slide with Palm Up  - 2 x daily - 7 x weekly - 3 sets - 10 reps  - Standing Shoulder Abduction Slides at Wall  - 2 x daily - 7 x weekly - 3 sets - 10 reps  - Sidelying Shoulder External Rotation  - 1 x daily - 7 x weekly - 3 sets - 10 reps - 2-5 #  - Sidelying Shoulder Horizontal Abduction  - 1 x daily - 7 x weekly - 3 sets - 10 reps - 2-5 #  - Standing Shoulder Row with Anchored Resistance  - 1 x daily - 7 x weekly - 3 sets - 10 reps - green band  - Shoulder extension with resistance - Neutral  - 1 x daily - 7 x weekly - 2 sets - 15 reps - green band  - Standing Shoulder Horizontal Abduction with Anchored Resistance   - 1 x daily - 7 x weekly - 3 sets - 10 reps - green band    Charges  3 TE, 1 NMR

## 2025-07-21 NOTE — TELEPHONE ENCOUNTER
LVM regarding no showed appt today. Offered available appts with other therapists at [facility] today and tomorrow. Can see anyone if he calls back. Will need progress note completed on his next visit and Marre submitted.     Shelby

## 2025-07-24 NOTE — PROGRESS NOTES
Patient: Amando Beatty (19 year old, male) Referring Provider:  Insurance:   Diagnosis: Tear of right glenoid labrum, subsequent encounter (S49.889F) Jesus Daley  [facility] MEDICAID   Date of Surgery: 4/1/25 Next MD visit:  N/A   Precautions:    6/18/25 Referral Information:    Date of Evaluation: Req: 2, Auth: 2, Exp: 9/12/2025 05/21/25 POC Auth Visits:          Today's Date   7/24/2025     Progress Summary  Pt has attended 14 visits in Physical Therapy.      Subjective  \"Feeling really good.\"  Pt states that he has to report back to school on 8/1.       Pain: 0/10     Objective           5/23/2025 6/16/2025 7/24/2025   Shoulder ROM/MMT   Rt Flexion Shoulder 170 P with stiffness at end range 160 deg (A) 163 deg (A)   Rt Flexion Shoulder MMT  5/5 5/5   Lt Flexion Shoulder MMT  5/5    Rt Extension Shoulder MMT  5/5 5/5   Rt Extension Shoulder MMT  5/5    Rt Abduction Shoulder 165 P 167 deg (A) 166 deg (A)   Rt Abduction Shoulder MMT (C5)  5/5 5/5   Lt Abduction Shoulder MMT (C5)  5/5    Rt IR Shoulder 60 P T6 (A) T5 (A)   Rt IR Shoulder MMT  5/5 5/5   Rt ER Shoulder 95 P 83 deg (A) 90 deg (A)   Rt ER Shoulder MMT  5/5 5/5   Rt Low Trap MMT  4+/5 5/5   Lt Low Trap MMT  5/5    Rt Mid Trap MMT  4+/5 5/5   Lt Mid Trap MMT  5/5    Rt Rhomboids MMT  4+/5 5/5   Lt Rhomboids MMT  5/5       Assessment   Pt presents to Physical Therapy s/p R shoulder labral repair on 4/1/25.  Pt is progressing very well in Physical Therapy per MD protocol guidelines.  Pt has achieved WNL AROM throughout the R shoulder.  Pt has also demonstrated gains in shoulder/scapular strength since time of IE.  Pt cont to demonstrate decreased stability and increased muscle fatigue with completion of UE CKC activities.  Pt is slowly progressing with return to weightlifting in preparation for return to football, yet remains limited with the amount of load he can place through his R shoulder.  Pt cont to be functionally limited with return to football  and strength training activities.  Pt would cont to benefit from skilled Physical Therapy to address functional and objective deficits and enable return to PLOF.  Thank you for referring Amando to [facility] [place].     Goals (to be met in 24 visits)   Goals: (To be met in 8-10 weeks post op )       Not Met Progress Toward Partially Met Met   Pt will improve shoulder flexion AROM to >150 degrees to be able to reach into overhead cabinets without pain or restriction []  []  []  [x]    Pt will improve shoulder abduction AROM to >130 degrees to improve ability to don deodorant, don/doff shirts, and wash hair  []  []  []  [x]    Pt will increase shoulder AROM ER to 80 degrees to be able to reach and fasten seatbelt  []  []  []  [x]    Pt will increase shoulder AROM IR to 70 degrees to be able to reach in back pocket, tuck in shirt, and turn steering wheel without pain []  []  []  [x]    Pt will be independent and compliant with comprehensive HEP to maintain progress achieved in PT  []  []  []  [x]          Goals: (To be met 4-6 months post op        Not Met Progress Toward Partially Met Met   Pt will improve shoulder strength throughout to 5/5 to improve function with return to sport []  []  [x]  []    Pt will demonstrate increased mid/low trap strength to 5/5 to promote improved shoulder mechanics and stabilization with lifting and reaching []  []  []  [x]    Pt will be independent and compliant with comprehensive HEP to maintain progress achieved in PT  []  [x]  []  []        QuickDASH Outcome Score  Score: (Patient-Rptd) 18.18 % (5/21/2025  2:11 PM)    Post QuickDASH Outcome Score  Post Score: (Patient-Rptd) 2.27 % (7/24/2025 12:36 PM)    15.91 % improvement    Plan: Continue skilled Physical Therapy 2 x/week or a total of 4 visits over a 90 day period as he will be returning to school. Treatment will include: therapeutic exercises, manual therapy, neuromuscular re-ed, and HEP  education.       Patient was advised of these findings, precautions, and treatment options and has agreed to actively participate in planning and for this course of care.    Thank you for your referral. If you have any questions, please contact me at Dept: [phone].    Sincerely,  Electronically signed by therapist: Cinthya Fajardo PT     Physician's certification required:  Yes  Please co-sign or sign and return this letter via fax as soon as possible to [phone].   I certify the need for these services furnished under this plan of treatment and while under my care.    X___________________________________________________ Date____________________    Certification From: 7/24/2025  To:10/22/2025         Treatment Last 4 Visits  Treatment Day: 14 7/8/2025 7/11/2025 7/17/2025 7/24/2025   UE Treatment   Therapeutic Exercise UBE 3' fwd/3' bwd (L 5.0)  Doorway Pecs Stretch 3x30 secs  TRX       Flex 10x10 secs       Horiz Abd 10x10 secs       Ext 10x10 secs  3 Way Shoulder Raises 3x10 ea 4#  OH Press 3x10 5#  Biceps Curls 3x10 8#  B Shoulder ER 3x10 BTB  Sport Cord Shoulder IR/ER 3x10 B  Table Pushups 3x10  Low Plank w/ Alt leg lift 3x30 secs  UE Step Ups in Plank x10 ea     UBE 3' fwd/3' bwd (L 5.0)  Doorway Pecs Stretch 3x30 secs  TRX       Flex 10x10 secs       Horiz Abd 10x10 secs       Ext 10x10 secs  3 Way Shoulder Raises 3x10 ea 4#  OH Press 3x10 5#  Biceps Curls 3x10 8#  B Shoulder ER 3x10 BTB  Sport Cord Shoulder IR/ER 3x10 B  Table Pushups 3x10  Low Plank w/ Alt leg lift 3x30 secs  UE Step Ups in Plank 4\" x10 ea  UE Step Overs in Plank 4\" x5  Wall Swims 2x10 YTB  UE Wall Walks x5 laps YTB UBE 3' fwd/3' bwd (L 5.0)   Doorway Pecs Stretch 3x30 secs   TRX       Flex 10x10 secs        Horiz Abd 10x10 secs        Ext 10x10 secs   3 Way Shoulder Raises 3x10 ea 5#   OH Press 3x10 6#   Biceps Curls 3x10 9#   Sport Cord Shoulder IR/ER   3x10 B B Shoulder ER 3x10 BTB     Table Pushups 3x10   Low Plank w/  Alt leg lift 3x30 secs   UE Step Ups in Plank 4\" x10 ea   UE Step Overs in Plank 4\" x5   Wall Swims 2x10 YTB   UE Wall Walks x5 laps YTB    UBE 3' fwd/3' bwd (L 5.0)  Doorway Pecs Stretch 3x30 secs  TRX       Flex 10x10 secs       Horiz Abd 10x10 secs       Ext 10x10 secs  3 Way Shoulder Raises 3x10 ea 5#  OH Press 3x10 7#  Biceps Curls 3x10 10#  Table Pushups 3x10  Low Plank w/ Alt leg lift 3x30 secs  UE Step Ups in Plank 4\" x10 ea  UE Step Overs in Plank 4\" x5  Wall Swims 2x10 YTB  UE Wall Walks x5 laps YTB  Reassessment for PN   Neuro Re-Education MB Scapular Stabilization        Flex/Ext x30       M/L x30       Circles (CW/CCW) x30 ea       ABCs x2  Sport Cord Rows 3x10 orange  Sport Cord Ext 3x10 Blk  Sport Cord Triceps Ext 3x10 Blk  Sport Cord Horiz Abd 3x10 B  Body Blade 4 Way 2x30 secs ea   MB Scapular Stabilization        Flex/Ext x30       M/L x30       Circles (CW/CCW) x30 ea       ABCs x2  Sport Cord Rows 3x10 orange  Sport Cord Ext 3x10 Blk  Sport Cord Triceps Ext 3x10 Blk  Sport Cord Horiz Abd 3x10 B MB Scapular Stabilization        Flex/Ext x30        M/L x30        Circles (CW/CCW) x30 ea        ABCs x2   Sport Cord Rows 3x10 orange   Sport Cord Ext 3x10 Blk   Sport Cord Triceps Ext 3x10 Blk   Sport Cord Horiz Abd 3x10 B blue    MB Scapular Stabilization        Flex/Ext x30       M/L x30       Circles (CW/CCW) x30 ea       ABCs x2     Therapeutic Exercise Minutes 25 43 43 43   Neuro Re-Educ Minutes 25 10 10 4   Total Time Of Timed Procedures 50 53 53 47   Total Time Of Service-Based Procedures 0 0 0 0   Total Treatment Time 50 53 53 47        HEP  Access Code: [email or web address]  URL: [email or web address]  Date: 05/21/2025  Prepared by: Shelby Larsen    Exercises  - Standing Single Shoulder Flexion Wall Slide with Palm Up  - 2 x daily - 7 x weekly - 3 sets - 10 reps  - Standing Shoulder Abduction Slides at Wall  - 2 x daily - 7 x weekly - 3 sets - 10 reps  - Sidelying Shoulder External  Patient is a 46y old  Female who presents with a chief complaint of Transfer for Combined Heart/Lung Transplant Eval (18 Jul 2022 17:03)      SUBJECTIVE / OVERNIGHT EVENTS: Patient seen and examined at bedside.    MEDICATIONS  (STANDING):  aspirin  chewable 81 milliGRAM(s) Oral daily  atorvastatin 40 milliGRAM(s) Oral at bedtime  benzonatate 200 milliGRAM(s) Oral every 8 hours  chlorhexidine 4% Liquid 1 Application(s) Topical <User Schedule>  digoxin     Tablet 125 MICROGram(s) Oral daily  epoprostenol Infusion 9.5 NANOGram(s)/kG/Min (7.95 mL/Hr) IV Continuous <Continuous>  furosemide    Tablet 40 milliGRAM(s) Oral <User Schedule>  heparin  Infusion 1400 Unit(s)/Hr (14 mL/Hr) IV Continuous <Continuous>  hydrocodone/homatropine Syrup 5 milliLiter(s) Oral every 4 hours  influenza   Vaccine 0.5 milliLiter(s) IntraMuscular once  levothyroxine 25 MICROGram(s) Oral daily  methylPREDNISolone sodium succinate Injectable 12.5 milliGRAM(s) IV Push daily  multivitamin 1 Tablet(s) Oral daily  nystatin    Suspension 078838 Unit(s) Oral daily  pantoprazole    Tablet 40 milliGRAM(s) Oral before breakfast  polyethylene glycol 3350 17 Gram(s) Oral two times a day  senna 2 Tablet(s) Oral at bedtime  thiamine 100 milliGRAM(s) Oral daily    MEDICATIONS  (PRN):  acetaminophen     Tablet .. 650 milliGRAM(s) Oral every 6 hours PRN Temp greater or equal to 38C (100.4F), Mild Pain (1 - 3)  albuterol/ipratropium for Nebulization 3 milliLiter(s) Nebulizer every 6 hours PRN Bronchospasm  benzocaine 15 mG/menthol 3.6 mG Lozenge 1 Lozenge Oral three times a day PRN Sore Throat  bisacodyl 5 milliGRAM(s) Oral every 12 hours PRN Constipation  diphenhydrAMINE Injectable 25 milliGRAM(s) IV Push every 4 hours PRN coughing  HYDROmorphone  Injectable 0.5 milliGRAM(s) IV Push every 6 hours PRN Severe Pain (7 - 10)  metoclopramide Injectable 10 milliGRAM(s) IV Push every 4 hours PRN nausea  simethicone 80 milliGRAM(s) Chew every 6 hours PRN Dyspepsia  sodium chloride 0.65% Nasal 1 Spray(s) Both Nostrils daily PRN Nasal Congestion      Vital Signs Last 24 Hrs  T(C): 36.6 (19 Jul 2022 04:00), Max: 36.7 (18 Jul 2022 08:00)  T(F): 97.9 (19 Jul 2022 04:00), Max: 98 (18 Jul 2022 08:00)  HR: 82 (19 Jul 2022 07:00) (76 - 125)  BP: 110/55 (19 Jul 2022 07:00) (98/51 - 125/94)  BP(mean): 79 (19 Jul 2022 07:00) (70 - 112)  RR: 26 (19 Jul 2022 07:00) (21 - 34)  SpO2: 78% (19 Jul 2022 07:00) (58% - 86%)    Parameters below as of 19 Jul 2022 03:18  Patient On (Oxygen Delivery Method): nasal cannula, high flow  O2 Flow (L/min): 50  O2 Concentration (%): 100  CAPILLARY BLOOD GLUCOSE        I&O's Summary    18 Jul 2022 07:01  -  19 Jul 2022 07:00  --------------------------------------------------------  IN: 765.6 mL / OUT: 1000 mL / NET: -234.4 mL        PHYSICAL EXAM:  CONSTITUTIONAL: No acute distress, well-developed, well-groomed, AAOx3  HEAD: Atraumatic, normocephalic  EYES: EOM intact, PERRLA, conjunctiva and sclera clear  ENT: Supple, no masses, no thyromegaly, no bruits, no JVD; moist mucous membranes  PULMONARY: Fine crackles throughout lung fields  CARDIOVASCULAR: Regular rate and rhythm; no murmurs, rubs, or gallops  GASTROINTESTINAL: Mild epigastric tenderness on palpation, hypoactive bowel sounds  MUSCULOSKELETAL: 2+ peripheral pulses; no clubbing, no cyanosis, no edema  NEUROLOGY: non-focal  SKIN: No rashes or lesions; warm and dry    LABS:                        10.9   11.69 )-----------( 250      ( 19 Jul 2022 00:39 )             36.9     07-19    134<L>  |  94<L>  |  12  ----------------------------<  98  4.2   |  29  |  0.44<L>    Ca    9.2      19 Jul 2022 00:44  Phos  4.4     07-19  Mg     1.9     07-19    TPro  6.3  /  Alb  3.4  /  TBili  0.8  /  DBili  x   /  AST  17  /  ALT  11  /  AlkPhos  66  07-19    PT/INR - ( 19 Jul 2022 00:39 )   PT: 12.3 sec;   INR: 1.07 ratio         PTT - ( 19 Jul 2022 00:39 )  PTT:62.7 sec          RADIOLOGY & ADDITIONAL TESTS:    Imaging Personally Reviewed:    Consultant(s) Notes Reviewed:      Care Discussed with Consultants/Other Providers:    Leslie Almanza, PGY-1; [facility] Pager: [phone]; [facility] Pager: [phone]   Rotation  - 1 x daily - 7 x weekly - 3 sets - 10 reps - 2-5 #  - Sidelying Shoulder Horizontal Abduction  - 1 x daily - 7 x weekly - 3 sets - 10 reps - 2-5 #  - Standing Shoulder Row with Anchored Resistance  - 1 x daily - 7 x weekly - 3 sets - 10 reps - green band  - Shoulder extension with resistance - Neutral  - 1 x daily - 7 x weekly - 2 sets - 15 reps - green band  - Standing Shoulder Horizontal Abduction with Anchored Resistance  - 1 x daily - 7 x weekly - 3 sets - 10 reps - green band    Charges  Therapeutic Ex x3                        Patient is a 46y old  Female who presents with a chief complaint of Transfer for Combined Heart/Lung Transplant Eval (18 Jul 2022 17:03)      SUBJECTIVE / OVERNIGHT EVENTS: Patient seen and examined at bedside. Overnight the pt received reglan for nausea. This morning she reports epigastric LUQ pain.    MEDICATIONS  (STANDING):  aspirin  chewable 81 milliGRAM(s) Oral daily  atorvastatin 40 milliGRAM(s) Oral at bedtime  benzonatate 200 milliGRAM(s) Oral every 8 hours  chlorhexidine 4% Liquid 1 Application(s) Topical <User Schedule>  digoxin     Tablet 125 MICROGram(s) Oral daily  epoprostenol Infusion 9.5 NANOGram(s)/kG/Min (7.95 mL/Hr) IV Continuous <Continuous>  furosemide    Tablet 40 milliGRAM(s) Oral <User Schedule>  heparin  Infusion 1400 Unit(s)/Hr (14 mL/Hr) IV Continuous <Continuous>  hydrocodone/homatropine Syrup 5 milliLiter(s) Oral every 4 hours  influenza   Vaccine 0.5 milliLiter(s) IntraMuscular once  levothyroxine 25 MICROGram(s) Oral daily  methylPREDNISolone sodium succinate Injectable 12.5 milliGRAM(s) IV Push daily  multivitamin 1 Tablet(s) Oral daily  nystatin    Suspension 298746 Unit(s) Oral daily  pantoprazole    Tablet 40 milliGRAM(s) Oral before breakfast  polyethylene glycol 3350 17 Gram(s) Oral two times a day  senna 2 Tablet(s) Oral at bedtime  thiamine 100 milliGRAM(s) Oral daily    MEDICATIONS  (PRN):  acetaminophen     Tablet .. 650 milliGRAM(s) Oral every 6 hours PRN Temp greater or equal to 38C (100.4F), Mild Pain (1 - 3)  albuterol/ipratropium for Nebulization 3 milliLiter(s) Nebulizer every 6 hours PRN Bronchospasm  benzocaine 15 mG/menthol 3.6 mG Lozenge 1 Lozenge Oral three times a day PRN Sore Throat  bisacodyl 5 milliGRAM(s) Oral every 12 hours PRN Constipation  diphenhydrAMINE Injectable 25 milliGRAM(s) IV Push every 4 hours PRN coughing  HYDROmorphone  Injectable 0.5 milliGRAM(s) IV Push every 6 hours PRN Severe Pain (7 - 10)  metoclopramide Injectable 10 milliGRAM(s) IV Push every 4 hours PRN nausea  simethicone 80 milliGRAM(s) Chew every 6 hours PRN Dyspepsia  sodium chloride 0.65% Nasal 1 Spray(s) Both Nostrils daily PRN Nasal Congestion      Vital Signs Last 24 Hrs  T(C): 36.6 (19 Jul 2022 04:00), Max: 36.7 (18 Jul 2022 08:00)  T(F): 97.9 (19 Jul 2022 04:00), Max: 98 (18 Jul 2022 08:00)  HR: 82 (19 Jul 2022 07:00) (76 - 125)  BP: 110/55 (19 Jul 2022 07:00) (98/51 - 125/94)  BP(mean): 79 (19 Jul 2022 07:00) (70 - 112)  RR: 26 (19 Jul 2022 07:00) (21 - 34)  SpO2: 78% (19 Jul 2022 07:00) (58% - 86%)    Parameters below as of 19 Jul 2022 03:18  Patient On (Oxygen Delivery Method): nasal cannula, high flow  O2 Flow (L/min): 50  O2 Concentration (%): 100  CAPILLARY BLOOD GLUCOSE        I&O's Summary    18 Jul 2022 07:01  -  19 Jul 2022 07:00  --------------------------------------------------------  IN: 765.6 mL / OUT: 1000 mL / NET: -234.4 mL        PHYSICAL EXAM:  CONSTITUTIONAL: No acute distress, well-developed, well-groomed, AAOx3  HEAD: Atraumatic, normocephalic  EYES: EOM intact, PERRLA, conjunctiva and sclera clear  ENT: Supple, no masses, no thyromegaly, no bruits, no JVD; moist mucous membranes  PULMONARY: Fine crackles throughout lung fields  CARDIOVASCULAR: Regular rate and rhythm; no murmurs, rubs, or gallops  GASTROINTESTINAL: Mild epigastric tenderness on palpation, moderate LUQ tenderness  MUSCULOSKELETAL: 2+ peripheral pulses; no clubbing, no cyanosis, no edema  NEUROLOGY: non-focal  SKIN: No rashes or lesions; warm and dry    LABS:                        10.9   11.69 )-----------( 250      ( 19 Jul 2022 00:39 )             36.9     07-19    134<L>  |  94<L>  |  12  ----------------------------<  98  4.2   |  29  |  0.44<L>    Ca    9.2      19 Jul 2022 00:44  Phos  4.4     07-19  Mg     1.9     07-19    TPro  6.3  /  Alb  3.4  /  TBili  0.8  /  DBili  x   /  AST  17  /  ALT  11  /  AlkPhos  66  07-19    PT/INR - ( 19 Jul 2022 00:39 )   PT: 12.3 sec;   INR: 1.07 ratio         PTT - ( 19 Jul 2022 00:39 )  PTT:62.7 sec          RADIOLOGY & ADDITIONAL TESTS:    Imaging Personally Reviewed:    Consultant(s) Notes Reviewed:      Care Discussed with Consultants/Other Providers:    Leslie Almanza, PGY-1; [facility] Pager: [phone]; [facility] Pager: [phone]

## (undated) NOTE — LETTER
Date: 8/25/2022    Patient Name: Adalberto Nissen          To Whom it may concern: This letter has been written at the patient's request. The above patient was seen at the [facility] for his physical. Please excuse for missing any school hours that he missed today. He is cleared to attend his classes again.     Sincerely,    Javier Silvestre MD

## (undated) NOTE — ED AVS SNAPSHOT
THE [facility] Emergency Department in [address]    Phone:  [phone]    Fax:  [phone]           Mary Humphreylorenza   MRN: [id number]    Department:  THE [facility] Emergency Department in [place]   Date of Visit:  [address] [place] [phone]  Pediatric [id number] Emergency Department   [phone]       To Check ER Wait Times:  TEXT 'ERwait' to 48059      Click [email or web address]      Or call [phone]    If you have any will be contacted. Please make sure we have your correct phone number before you leave. After you leave, you should follow the attached instructions. I have read and understand the instructions given to me by my caregivers.         24-Hour Pharmacies CT APPENDIX ABD/PEL WO CONTRAST (CRW=[id number]) (Final result) Result time:  05/16/17 20:44:26    Final result    Impression:    CONCLUSION:  No evidence for acute appendicitis.            Dictated by: Deepak Hurst MD on 5/16/2017 at 20:37       Approved by Sign Up Forms link in the Additional Information box on the right. [employer] Questions? Call [phone] for help. [employer] is NOT to be used for urgent needs. For medical emergencies, dial 911.

## (undated) NOTE — LETTER
[facility] [employer] of Child Health Examination       Student's Name  Jaquanuyen Darryn Birth Date Title                           Date     Signature                                                                                                                                              Title BY PARENT/GUARDIAN AND VERIFIED BY HEALTH CARE PROVIDER    ALLERGIES  (Food, drug, insect, other)  Patient has no known allergies. MEDICATION  (List all prescribed or taken on a regular basis.)  No current outpatient medications on file.    Diagnosis of ast 97.2 °F (36.2 °C) (Temporal)   Resp 16   Ht 6' 1\" (1.854 m)   Wt 201 lb   SpO2 97%   BMI 26.52 kg/m²     DIABETES SCREENING  BMI>85% age/sex  No And any two of the following:  Family History No    Ethnic Minority  No          Signs of Insulin Resistance ( Health Yes        Currently Prescribed Asthma Medication:            Quick-relief  medication (e.g. Short Acting Beta Antagonist): No          Controller medication (e.g. inhaled corticosteroid):   No Other   NEEDS/MODIFICATIONS required in the school sett

## (undated) NOTE — LETTER
Patient Name: Amando Beatty  YOB: 2005          MRN :  [id number]  Date:  6/17/2025  Referring Physician:  Jesus Daley    Progress Summary  Pt has attended 8 visits in Physical Therapy.      Subjective  Pt states that his shoulder is doing well.  Pt reports occasional discomfort in the area of the biceps with certain activities.  Pt is eager to get back into the gym.       Pain: 0/10     Objective           5/21/2025 5/23/2025 6/16/2025   Shoulder ROM/MMT   Rt Flexion Shoulder 142 170 P with stiffness at end range 160 deg (A)   Rt Flexion Shoulder MMT 4/5  5/5   Lt Flexion Shoulder MMT 5/5  5/5   Rt Extension Shoulder MMT   5/5   Rt Extension Shoulder MMT   5/5   Rt Abduction Shoulder 148 165 P 167 deg (A)   Rt Abduction Shoulder MMT (C5) 4-/5  5/5   Lt Abduction Shoulder MMT (C5) 5/5  5/5   Rt IR Shoulder T12 60 P T6 (A)   Lt IR Shoulder T8     Rt IR Shoulder MMT 4/5  5/5   Lt IR Shoulder MMT 5/5     Rt ER Shoulder T4 95 P 83 deg (A)   Lt ER Shoulder T4     Rt ER Shoulder MMT 4-/5  5/5   Lt ER Shoulder MMT 5/5     Rt Low Trap MMT   4+/5   Lt Low Trap MMT   5/5   Rt Mid Trap MMT   4+/5   Lt Mid Trap MMT   5/5   Rt Rhomboids MMT   4+/5   Lt Rhomboids MMT   5/5      Assessment   Pt presents to Physical Therapy s/p R shoulder labral repair on 4/1/25.  Pt is progressing very well in Physical Therapy towards all functional goals.  Pt has demonstrated significant gains in AROM of the R shoulder, nearing full mobility.  Pt has also demonstrated improvement is UE/scapular strength since time of IE.  Pt cont to be functionally limited with lifting weights and participating in football.  Pt would cont to benefit from skilled Physical Therapy to address functional and objective deficits and enable return to OF.  Thank you for referring Amando to [facility] [place].     Goals (to be met in 24 visits)   Goals: (To be met in 8-10 weeks post op )       Not Met Progress Toward  Partially Met Met   Pt will improve shoulder flexion AROM to >150 degrees to be able to reach into overhead cabinets without pain or restriction []  []  []  [x]    Pt will improve shoulder abduction AROM to >130 degrees to improve ability to don deodorant, don/doff shirts, and wash hair  []  []  []  [x]    Pt will increase shoulder AROM ER to 80 degrees to be able to reach and fasten seatbelt  []  []  []  [x]    Pt will increase shoulder AROM IR to 70 degrees to be able to reach in back pocket, tuck in shirt, and turn steering wheel without pain []  []  []  [x]    Pt will be independent and compliant with comprehensive HEP to maintain progress achieved in PT  []  []  []  [x]          Goals: (To be met 4-6 months post op        Not Met Progress Toward Partially Met Met   Pt will improve shoulder strength throughout to 5/5 to improve function with return to sport []  [x]  []  []    Pt will demonstrate increased mid/low trap strength to 5/5 to promote improved shoulder mechanics and stabilization with lifting and reaching []  [x]  []  []    Pt will be independent and compliant with comprehensive HEP to maintain progress achieved in PT  []  [x]  []  []        QuickDASH Outcome Score  Score: (Patient-Rptd) 18.18 % (5/21/2025  2:11 PM)    Post QuickDASH Outcome Score  Post Score: (Patient-Rptd) 2.27 % (6/16/2025  3:31 PM)    15.91 % improvement    Plan: Continue skilled Physical Therapy 2 x/week or a total of 16 visits over a 90 day period. Treatment will include: therapeutic exercises, manual therapy, neuromuscular re-ed, and HEP education.       Patient was advised of these findings, precautions, and treatment options and has agreed to actively participate in planning and for this course of care.    Thank you for your referral. If you have any questions, please contact me at Dept: [phone].    Sincerely,  Electronically signed by therapist: Cinthya Fajardo, PT     Physician's certification required:  Yes  Please  co-sign or sign and return this letter via fax as soon as possible to [phone].   I certify the need for these services furnished under this plan of treatment and while under my care.    X___________________________________________________ Date____________________    Certification From: 6/16/2025  To:9/14/2025 21st Century Cures Act Notice to Patient: Medical documents like this are made available to patients in the interest of transparency. However, be advised this is a medical document and it is intended as [id number] communication between your medical providers. This medical document may contain abbreviations, assessments, medical data, and results or other terms that are unfamiliar. Medical documents are intended to carry relevant information, facts as evident, and the clinical opinion of the practitioner. As such, this medical document may be written in language that appears blunt or direct. You are encouraged to contact your medical provider and/or [facility] Patient Experience if you have any questions about this medical document.

## (undated) NOTE — LETTER
Name:  Jd Doll Year:  11th Grade Class: Student ID No.:   Address:  [address] [place] [address] Phone:  [phone] (home)  :  11year old   Name Relationship [address]gl [address] Work Phone Home Phone Mobile Phone   1.  Shannan Aguilar Brugada syndrome, or catecholaminergic polymorphic ventricular tachycardia? No   15. Does anyone in your family have a heart problem, pacemaker, or implanted defibrillator? No   16.  Has anyone in your family had unexplained fainting, seizures, or near drow seizure disorder? No   37. Do you have headaches with exercise? No   38. Have you ever had numbness, tingling, or weakness in your arms or legs after being hit or falling? No   39. Have you ever been unable to move your arms / legs after being hit /fall?  No (Z= 1.39) based on CDC (Boys, 2-20 Years) BMI-for-age based on BMI available as of 7/27/2021.  male    Vision: R            L            BOTH                MEDICAL NORMAL ABNORMAL FINDINGS   Appearance:  Marfan stigmata (kyphoscoliosis, high-arched palate, Testing   (This section for high school students only)   3268-3074 school term    As a prerequisite to participation in [employer] activities, we agree that I/our student will not use performance-enhancing substances as defined in the [address] [facility]

## (undated) NOTE — LETTER
Name:  Sujit Done Year:  10th Grade Class: Student ID No.:   Address:  [address] [place] [address] Phone:  [phone] (home)  :  12year old   Name Relationship Lgl [address] Work Phone Home Phone Mobile Phone   1.  Clara Madrigal 13. Does anyone in your family have a heart problem, pacemaker, or implanted defibrillator? No   16. Has anyone in your family had unexplained fainting, seizures, or near drowning?  No   BONE AND JOINT QUESTIONS    17. Have you ever had an injury to a bone, 38. Have you ever had numbness, tingling, or weakness in your arms or legs after being hit or falling? No   39. Have you ever been unable to move your arms / legs after being hit /fall? No   40. Have you ever become ill while exercising in the heat?  Yes   4 1.39) based on CDC (Boys, 2-20 Years) BMI-for-age based on BMI available as of 7/30/2020.  male    Vision: R            L            BOTH                MEDICAL NORMAL ABNORMAL FINDINGS   Appearance:  Marfan stigmata (kyphoscoliosis, high-arched palate, pec [facility] Substance Testing Policy Consent to Random Testing   (This section for high school students only)   3809-1793 school term    As a prerequisite to participation in [employer] activities, we agree that I/our student will not use performance-enhancing

## (undated) NOTE — LETTER
Patient Name: Amando Beatty  YOB: 2005          MRN :  [id number]  Date:  6/17/2025  Referring Physician:  Jesus Daley    Progress Summary  Pt has attended 8 visits in Physical Therapy.      Subjective  Pt states that his shoulder is doing well.  Pt reports occasional discomfort in the area of the biceps with certain activities.  Pt is eager to get back into the gym.       Pain: 0/10     Objective           5/21/2025 5/23/2025 6/16/2025   Shoulder ROM/MMT   Rt Flexion Shoulder 142 170 P with stiffness at end range 160 deg (A)   Rt Flexion Shoulder MMT 4/5  5/5   Lt Flexion Shoulder MMT 5/5  5/5   Rt Extension Shoulder MMT   5/5   Rt Extension Shoulder MMT   5/5   Rt Abduction Shoulder 148 165 P 167 deg (A)   Rt Abduction Shoulder MMT (C5) 4-/5  5/5   Lt Abduction Shoulder MMT (C5) 5/5  5/5   Rt IR Shoulder T12 60 P T6 (A)   Lt IR Shoulder T8     Rt IR Shoulder MMT 4/5  5/5   Lt IR Shoulder MMT 5/5     Rt ER Shoulder T4 95 P 83 deg (A)   Lt ER Shoulder T4     Rt ER Shoulder MMT 4-/5  5/5   Lt ER Shoulder MMT 5/5     Rt Low Trap MMT   4+/5   Lt Low Trap MMT   5/5   Rt Mid Trap MMT   4+/5   Lt Mid Trap MMT   5/5   Rt Rhomboids MMT   4+/5   Lt Rhomboids MMT   5/5      Assessment   Pt presents to Physical Therapy s/p R shoulder labral repair on 4/1/25.  Pt is progressing very well in Physical Therapy towards all functional goals.  Pt has demonstrated significant gains in AROM of the R shoulder, nearing full mobility.  Pt has also demonstrated improvement is UE/scapular strength since time of IE.  Pt cont to be functionally limited with lifting weights and participating in football.  Pt would cont to benefit from skilled Physical Therapy to address functional and objective deficits and enable return to OF.  Thank you for referring Amando to [facility] [place].     Goals (to be met in 24 visits)   Goals: (To be met in 8-10 weeks post op )       Not Met Progress Toward  Partially Met Met   Pt will improve shoulder flexion AROM to >150 degrees to be able to reach into overhead cabinets without pain or restriction []  []  []  [x]    Pt will improve shoulder abduction AROM to >130 degrees to improve ability to don deodorant, don/doff shirts, and wash hair  []  []  []  [x]    Pt will increase shoulder AROM ER to 80 degrees to be able to reach and fasten seatbelt  []  []  []  [x]    Pt will increase shoulder AROM IR to 70 degrees to be able to reach in back pocket, tuck in shirt, and turn steering wheel without pain []  []  []  [x]    Pt will be independent and compliant with comprehensive HEP to maintain progress achieved in PT  []  []  []  [x]          Goals: (To be met 4-6 months post op        Not Met Progress Toward Partially Met Met   Pt will improve shoulder strength throughout to 5/5 to improve function with return to sport []  [x]  []  []    Pt will demonstrate increased mid/low trap strength to 5/5 to promote improved shoulder mechanics and stabilization with lifting and reaching []  [x]  []  []    Pt will be independent and compliant with comprehensive HEP to maintain progress achieved in PT  []  [x]  []  []        QuickDASH Outcome Score  Score: (Patient-Rptd) 18.18 % (5/21/2025  2:11 PM)    Post QuickDASH Outcome Score  Post Score: (Patient-Rptd) 2.27 % (6/16/2025  3:31 PM)    15.91 % improvement    Plan: Continue skilled Physical Therapy 2 x/week or a total of 16 visits over a 90 day period. Treatment will include: therapeutic exercises, manual therapy, neuromuscular re-ed, and HEP education.       Patient was advised of these findings, precautions, and treatment options and has agreed to actively participate in planning and for this course of care.    Thank you for your referral. If you have any questions, please contact me at Dept: [phone].    Sincerely,  Electronically signed by therapist: Cinthya Fajardo, PT     Physician's certification required:  Yes  Please  co-sign or sign and return this letter via fax as soon as possible to [phone].   I certify the need for these services furnished under this plan of treatment and while under my care.    X___________________________________________________ Date____________________    Certification From: 6/16/2025  To:9/14/2025 21st Century Cures Act Notice to Patient: Medical documents like this are made available to patients in the interest of transparency. However, be advised this is a medical document and it is intended as [id number] communication between your medical providers. This medical document may contain abbreviations, assessments, medical data, and results or other terms that are unfamiliar. Medical documents are intended to carry relevant information, facts as evident, and the clinical opinion of the practitioner. As such, this medical document may be written in language that appears blunt or direct. You are encouraged to contact your medical provider and/or [facility] Patient Experience if you have any questions about this medical document.

## (undated) NOTE — ED AVS SNAPSHOT
THE [facility] Emergency Department in [address] [facility]    Phone:  [phone]    Fax:  [phone]           Lyudmila Pearson   MRN: [id number]    Department:  THE [facility] Emergency Department in [place]   Date of Visit:  5 IF THERE IS ANY CHANGE OR WORSENING OF YOUR CONDITION, CALL YOUR PRIMARY CARE PHYSICIAN AT ONCE OR RETURN IMMEDIATELY TO THE EMERGENCY DEPARTMENT.     If you have been prescribed any medication(s), please fill your prescription right away and begin taking t

## (undated) NOTE — LETTER
Date: 2/10/2021    Patient Name: Jose Hickman          To Whom it may concern: The above patient was seen at the [facility] for treatment of a medical condition. Patient can return to sport without restriction.        Sincerely,    Savana Back

## (undated) NOTE — LETTER
Date: 1/27/2021    Patient Name: Magnolia Riley          To Whom it may concern: The above patient was seen at the [facility] for treatment of a medical condition.     Patient can participate in practice and games with left small finger bib

## (undated) NOTE — LETTER
[facility] [email or web address] of Child Health Examination       Student's Name  Kevin Jones Birth Date Title                           Date     Signature HEALTH HISTORY          TO BE COMPLETED AND SIGNED BY PARENT/GUARDIAN AND VERIFIED BY HEALTH CARE PROVIDER    ALLERGIES  (Food, drug, insect, other)  Patient has no known allergies.  MEDICATION  (List all prescribed or taken on a regular basis.)  No current /80 (BP Location: Right arm, Patient Position: Sitting, Cuff Size: adult)   Pulse 70   Temp 98.1 °F (36.7 °C) (Temporal)   Resp 16   Ht 71\"   Wt 185 lb (83.9 kg)   SpO2 99%   BMI 25.80 kg/m²     DIABETES SCREENING  BMI>85% age/sex  No And any two of Cardiovascular/HTN Yes  Nutritional status Yes    Respiratory Yes                   Diagnosis of Asthma: No Mental Health Yes        Currently Prescribed Asthma Medication:            Quick-relief  medication (e.g. Short Acting Beta Antagonist):  No